# Patient Record
Sex: FEMALE | Race: WHITE | Employment: OTHER | URBAN - NONMETROPOLITAN AREA
[De-identification: names, ages, dates, MRNs, and addresses within clinical notes are randomized per-mention and may not be internally consistent; named-entity substitution may affect disease eponyms.]

---

## 2017-04-25 ENCOUNTER — HOSPITAL ENCOUNTER (OUTPATIENT)
Dept: NEUROLOGY | Age: 47
Discharge: HOME | End: 2017-04-25
Payer: COMMERCIAL

## 2017-04-25 PROCEDURE — 95886 MUSC TEST DONE W/N TEST COMP: CPT

## 2017-04-25 PROCEDURE — 95909 NRV CNDJ TST 5-6 STUDIES: CPT

## 2017-04-25 PROCEDURE — 95909 NRV CNDJ TST 5-6 STUDIES: CPT | Performed by: PSYCHIATRY & NEUROLOGY

## 2017-04-25 PROCEDURE — 95886 MUSC TEST DONE W/N TEST COMP: CPT | Performed by: PSYCHIATRY & NEUROLOGY

## 2017-12-07 ENCOUNTER — APPOINTMENT (OUTPATIENT)
Dept: GENERAL RADIOLOGY | Facility: HOSPITAL | Age: 47
End: 2017-12-07

## 2017-12-07 ENCOUNTER — HOSPITAL ENCOUNTER (EMERGENCY)
Facility: HOSPITAL | Age: 47
Discharge: HOME | End: 2017-12-07
Admitting: FAMILY MEDICINE

## 2017-12-07 ENCOUNTER — HOSPITAL ENCOUNTER (EMERGENCY)
Facility: HOSPITAL | Age: 47
Discharge: HOME | End: 2017-12-07
Attending: EMERGENCY MEDICINE | Admitting: EMERGENCY MEDICINE

## 2017-12-07 VITALS
HEART RATE: 95 BPM | WEIGHT: 257 LBS | OXYGEN SATURATION: 95 % | TEMPERATURE: 97.8 F | SYSTOLIC BLOOD PRESSURE: 124 MMHG | RESPIRATION RATE: 18 BRPM | HEIGHT: 65 IN | DIASTOLIC BLOOD PRESSURE: 84 MMHG | BODY MASS INDEX: 42.82 KG/M2

## 2017-12-07 VITALS
OXYGEN SATURATION: 95 % | WEIGHT: 256.84 LBS | RESPIRATION RATE: 20 BRPM | DIASTOLIC BLOOD PRESSURE: 75 MMHG | HEIGHT: 65 IN | SYSTOLIC BLOOD PRESSURE: 123 MMHG | BODY MASS INDEX: 42.79 KG/M2 | TEMPERATURE: 97.7 F | HEART RATE: 91 BPM

## 2017-12-07 DIAGNOSIS — S53.105A ELBOW DISLOCATION, LEFT, INITIAL ENCOUNTER: Primary | ICD-10-CM

## 2017-12-07 DIAGNOSIS — M79.602 LEFT ARM PAIN: Primary | ICD-10-CM

## 2017-12-07 DIAGNOSIS — S53.105S ELBOW DISLOCATION, LEFT, SEQUELA: ICD-10-CM

## 2017-12-07 PROCEDURE — 73030 X-RAY EXAM OF SHOULDER: CPT

## 2017-12-07 PROCEDURE — 25010000002 ONDANSETRON PER 1 MG: Performed by: NURSE PRACTITIONER

## 2017-12-07 PROCEDURE — 96372 THER/PROPH/DIAG INJ SC/IM: CPT

## 2017-12-07 PROCEDURE — 73070 X-RAY EXAM OF ELBOW: CPT

## 2017-12-07 PROCEDURE — 73080 X-RAY EXAM OF ELBOW: CPT

## 2017-12-07 PROCEDURE — 99284 EMERGENCY DEPT VISIT MOD MDM: CPT

## 2017-12-07 PROCEDURE — 73110 X-RAY EXAM OF WRIST: CPT

## 2017-12-07 PROCEDURE — 99285 EMERGENCY DEPT VISIT HI MDM: CPT

## 2017-12-07 PROCEDURE — 25010000002 HYDROMORPHONE PER 4 MG: Performed by: NURSE PRACTITIONER

## 2017-12-07 RX ORDER — PILOCARPINE HYDROCHLORIDE 5 MG/1
5 TABLET, FILM COATED ORAL 3 TIMES DAILY
COMMUNITY

## 2017-12-07 RX ORDER — OXYCODONE AND ACETAMINOPHEN 7.5; 325 MG/1; MG/1
1 TABLET ORAL EVERY 4 HOURS PRN
Qty: 15 TABLET | Refills: 0 | Status: SHIPPED | OUTPATIENT
Start: 2017-12-07

## 2017-12-07 RX ORDER — HYDROXYCHLOROQUINE SULFATE 200 MG/1
200 TABLET, FILM COATED ORAL 2 TIMES DAILY
COMMUNITY

## 2017-12-07 RX ORDER — GABAPENTIN 300 MG/1
300 CAPSULE ORAL 2 TIMES DAILY
COMMUNITY

## 2017-12-07 RX ORDER — PREDNISONE 1 MG/1
1 TABLET ORAL DAILY
COMMUNITY

## 2017-12-07 RX ORDER — HYDROCODONE BITARTRATE AND ACETAMINOPHEN 7.5; 325 MG/1; MG/1
1 TABLET ORAL 2 TIMES DAILY PRN
COMMUNITY

## 2017-12-07 RX ORDER — OMEPRAZOLE 20 MG/1
20 CAPSULE, DELAYED RELEASE ORAL DAILY
COMMUNITY

## 2017-12-07 RX ORDER — LEVOTHYROXINE SODIUM 0.12 MG/1
125 TABLET ORAL DAILY
COMMUNITY

## 2017-12-07 RX ORDER — FOLIC ACID 1 MG/1
1 TABLET ORAL DAILY
COMMUNITY

## 2017-12-07 RX ORDER — ETOMIDATE 2 MG/ML
0.3 INJECTION INTRAVENOUS ONCE
Status: COMPLETED | OUTPATIENT
Start: 2017-12-07 | End: 2017-12-07

## 2017-12-07 RX ORDER — ONDANSETRON 2 MG/ML
4 INJECTION INTRAMUSCULAR; INTRAVENOUS ONCE
Status: COMPLETED | OUTPATIENT
Start: 2017-12-07 | End: 2017-12-07

## 2017-12-07 RX ORDER — DULOXETIN HYDROCHLORIDE 30 MG/1
30 CAPSULE, DELAYED RELEASE ORAL DAILY
COMMUNITY

## 2017-12-07 RX ADMIN — HYDROMORPHONE HYDROCHLORIDE 1 MG: 1 INJECTION, SOLUTION INTRAMUSCULAR; INTRAVENOUS; SUBCUTANEOUS at 20:12

## 2017-12-07 RX ADMIN — ONDANSETRON 4 MG: 2 INJECTION, SOLUTION INTRAMUSCULAR; INTRAVENOUS at 20:12

## 2017-12-07 RX ADMIN — ETOMIDATE 5 MG: 2 INJECTION, SOLUTION INTRAVENOUS at 02:03

## 2017-12-07 NOTE — ED NOTES
PT TOLERATING ORAL FLUIDS AND O2 SAT WITHOUT OXYGEN. PT CAOX4 WITH  AT BEDSIDE.      Fausto Delgado RN  12/07/17 4842

## 2017-12-07 NOTE — ED PROVIDER NOTES
Subjective   HPI Comments: Patient comes in here after falling at home and striking her elbow on the hard floor.  Patient always been medicated by EMS and is Quite drowsy patient denies any head or neck pain and no other injury    Patient is a 47 y.o. female presenting with fall.   History provided by:  Patient and EMS personnel  Fall   Mechanism of injury: fall    Injury location:  Shoulder/arm  Shoulder/arm injury location:  L elbow  Incident location:  Home  Arrived directly from scene: yes    Fall:     Fall occurred:  In the bathroom    Height of fall:  0    Impact surface:  Hard floor    Point of impact: ELBOW.  Suspicion of alcohol use: no    Suspicion of drug use: no    Prior to arrival data:     Immobilization:  None  Associated symptoms: no abdominal pain, no back pain, no chest pain, no difficulty breathing, no headaches, no loss of consciousness, no nausea, no neck pain, no seizures and no vomiting    Risk factors: steroid use    Risk factors: no anticoagulation therapy, no asthma, no CHF, no COPD, no diabetes, no dialysis, no hemophilia, no kidney disease and no past MI        Review of Systems   Respiratory: Negative.    Cardiovascular: Negative for chest pain.   Gastrointestinal: Negative for abdominal pain, nausea and vomiting.   Genitourinary: Negative.    Musculoskeletal: Negative for back pain and neck pain.   Neurological: Negative for seizures, loss of consciousness and headaches.   All other systems reviewed and are negative.      Past Medical History:   Diagnosis Date   • Disease of thyroid gland    • Lupus        No Known Allergies    Past Surgical History:   Procedure Laterality Date   • CHOLECYSTECTOMY     • PARTIAL HYSTERECTOMY         History reviewed. No pertinent family history.    Social History     Social History   • Marital status:      Spouse name: N/A   • Number of children: N/A   • Years of education: N/A     Social History Main Topics   • Smoking status: Never Smoker   •  Smokeless tobacco: None   • Alcohol use No   • Drug use: No   • Sexual activity: Not Asked     Other Topics Concern   • None     Social History Narrative   • None           Objective   Physical Exam   Constitutional: She is oriented to person, place, and time. She appears well-developed and well-nourished. She appears distressed.   HENT:   Head: Normocephalic and atraumatic.   Eyes: Conjunctivae are normal. Pupils are equal, round, and reactive to light.   Neck: Normal range of motion.   Cardiovascular: Normal rate and regular rhythm.    Pulmonary/Chest: Effort normal and breath sounds normal.   Abdominal: Soft. There is no tenderness.   Musculoskeletal:   Patient appears to have deformity to the left elbow.  Patient is now any open areas and distal neurovascular examination is intact.  Patient rest examination is unremarkable.  Patient does of some shoulder pain although shoulder appears to be in place   Neurological: She is alert and oriented to person, place, and time. She exhibits normal muscle tone.   Skin: Skin is warm and dry.   Nursing note and vitals reviewed.      FX Dislocation  Date/Time: 12/7/2017 2:00 AM  Performed by: PEPPER FOFANA  Authorized by: PEPPER FOFANA     Consent:     Consent obtained:  Written    Consent given by:  Spouse    Risks discussed:  Nerve damage, pain and vascular damage    Alternatives discussed:  No treatment, delayed treatment and referral  Injury:     Injury location:  Elbow    Elbow injury location:  L elbow    Elbow fracture type comment:  Posterior elbow dislocation  Pre-procedure assessment:     Neurological function: normal      Distal perfusion: normal      Range of motion: normal    Sedation:     Sedation type:  Moderate (conscious) sedation  Procedure details:     Manipulation performed: yes      Skin traction used: no      Skeletal traction used: no      Pin inserted: no      Reduction successful: yes      X-ray confirmed reduction: yes      Immobilization:   Sling and splint    Splint type:  Long arm    Supplies used:  Ortho-Glass  Post-procedure assessment:     Neurological function: normal      Distal perfusion: normal      Range of motion: normal      Patient tolerance of procedure:  Tolerated well, no immediate complications  Comments:      Postprocedure x-ray shows patient to good reduction without any fracture fragments             ED Course  ED Course        Labs Reviewed - No data to display    XR Shoulder 2+ View Left    (Results Pending)   XR Elbow 3+ View Left    (Results Pending)   XR Elbow 2 View Left    (Results Pending)           x-ray of shoulder was unremarkable and post reduction film showed no fracture and good reduction.  Patient was given a long-arm splint in position of function by me and patient neurovascular examination was unremarkable.  Patient also was given sling to the left arm.  Patient was given orthopedic referral and called office and morning    [facility]    Final diagnoses:   Elbow dislocation, left, initial encounter            Luiz Pearl MD  12/07/17 0256

## 2017-12-08 NOTE — ED PROVIDER NOTES
Subjective   HPI Comments: Patient is a 39yo white female presents with left upper extremity pain.  She was seen in the emergency department last not after falling and sustained a left elbow dislocation.  She states that she was sedated and the elbow was reduced and she was placed in a splint.  She left the emergency department around 3 or 4 o'clock this morning.  She states throughout the day she has had increased swelling and pain and states that she had she noticed some blister formation from underneath her splint that she had placed last night.  She states she is also having left wrist pain as well and she does not think it was x-rayed last night.  She states her fingers are very swollen tonight and she is beginning not to be able to fill her fingers.    Patient is a 47 y.o. female presenting with upper extremity pain.   History provided by:  Patient   used: No    Upper Extremity Issue       Review of Systems   Constitutional: Negative.    HENT: Negative.    Eyes: Negative.    Respiratory: Negative.    Cardiovascular: Negative.    Gastrointestinal: Negative.    Endocrine: Negative.    Genitourinary: Negative.    Musculoskeletal:        Pt presents with lue pain and swelling. Pt was seen in the er last night after falling and sustained an elbow dislocation. She states that she left here around 0300 this am. She states that she has kept the extremity elevated throughout the day but has continued have increased pain and swelling to the extremity.  She states that she noticed some blister formation noted to the medial aspect of her left elbow earlier today and she is having swelling to her fingers and states she is having some numbness and tingling to her fingers as well.  She states she is also having some left wrist pain as well and does not think that she had her wrist x-rayed last night.   Skin: Negative.    Allergic/Immunologic: Negative.    Neurological: Negative.    Hematological:  Negative.    Psychiatric/Behavioral: Negative.    All other systems reviewed and are negative.      Past Medical History:   Diagnosis Date   • Disease of thyroid gland    • Fibromyalgia    • Lupus        No Known Allergies    Past Surgical History:   Procedure Laterality Date   • CHOLECYSTECTOMY     • PARTIAL HYSTERECTOMY         History reviewed. No pertinent family history.    Social History     Social History   • Marital status:      Spouse name: N/A   • Number of children: N/A   • Years of education: N/A     Social History Main Topics   • Smoking status: Never Smoker   • Smokeless tobacco: Never Used   • Alcohol use No   • Drug use: No   • Sexual activity: Not Asked     Other Topics Concern   • None     Social History Narrative   • None       Prior to Admission medications    Medication Sig Start Date End Date Taking? Authorizing Provider   DULoxetine (CYMBALTA) 30 MG capsule Take 30 mg by mouth Daily.   Yes Historical Provider, MD   folic acid (FOLVITE) 1 MG tablet Take 1 mg by mouth Daily.   Yes Historical Provider, MD   gabapentin (NEURONTIN) 300 MG capsule Take 300 mg by mouth 2 (Two) Times a Day.   Yes Historical Provider, MD   HYDROcodone-acetaminophen (NORCO) 7.5-325 MG per tablet Take 1 tablet by mouth 2 (Two) Times a Day As Needed for Moderate Pain .   Yes Historical Provider, MD   hydroxychloroquine (PLAQUENIL) 200 MG tablet Take 200 mg by mouth 2 (Two) Times a Day.   Yes Historical Provider, MD   levothyroxine (SYNTHROID, LEVOTHROID) 125 MCG tablet Take 125 mcg by mouth Daily.   Yes Historical Provider, MD   omeprazole (priLOSEC) 20 MG capsule Take 20 mg by mouth Daily.   Yes Historical Provider, MD   PrednisoLONE 5 MG tablet Take 4 mg by mouth Daily.   Yes Historical Provider, MD   methotrexate 2.5 MG tablet Take 2.5 mg by mouth 1 (One) Time Per Week. 6 tablets once a week    Historical Provider, MD   pilocarpine (SALAGEN) 5 MG tablet Take 5 mg by mouth 3 (Three) Times a Day.    Historical  "Provider, MD   predniSONE (DELTASONE) 1 MG tablet Take 1 mg by mouth Daily. Take 4 tablets daily or as instructed.    Historical Provider, MD       /75 (BP Location: Right arm, Patient Position: Lying)  Pulse 91  Temp 97.7 °F (36.5 °C) (Temporal Artery )   Resp 20  Ht 165.1 cm (65\")  Wt 116 kg (256 lb 13.4 oz)  SpO2 95%  BMI 42.74 kg/m2    Objective   Physical Exam   Constitutional: She is oriented to person, place, and time. She appears well-developed and well-nourished.   Appears to be in pain    HENT:   Head: Normocephalic and atraumatic.   Eyes: Conjunctivae and EOM are normal. Pupils are equal, round, and reactive to light.   Neck: Normal range of motion. Neck supple. No tracheal deviation present. No thyromegaly present.   Cardiovascular: Normal rate, regular rhythm, normal heart sounds and intact distal pulses.    Pulmonary/Chest: Effort normal and breath sounds normal. No respiratory distress. She has no wheezes. She has no rales. She exhibits no tenderness.   Abdominal: Soft. Bowel sounds are normal.   Musculoskeletal:   LUE: moderate amt of soft tissue swelling noted. Splint with opening noted to medial aspect of left elbow with moderate blister formation note. Fingers very edematous and cool to the touch. Splint very tight. Splint is removed. Pt has tenderness on palpation of distal radial aspect of left wrist as well. Pedal pulses palp.    Neurological: She is alert and oriented to person, place, and time. She has normal reflexes. No cranial nerve deficit.   Skin: Skin is warm and dry.   Psychiatric: She has a normal mood and affect. Her behavior is normal. Judgment and thought content normal.   Nursing note and vitals reviewed.      Procedures         Lab Results (last 24 hours)     ** No results found for the last 24 hours. **          XR Wrist 3+ View Left   Final Result      XR Shoulder 2+ View Left   Final Result          ED Course  ED Course   Comment By Time   Reviewed results with pt " and pt spouse. Have ordered another splint on pt. Gopi report completed #[id number]. No signs of suspicious activity noted. Will write for small amt of pain medication for acute pain. Reviewed side effects and potential for abuse. Will be discharged soon in stable condition. Pt has appointment with dr andrade next week. Advised to keep elevated and ice. Advised to return before if symptoms worsen Drea Sidhu, JERE 12/07 2046          MDM  Number of Diagnoses or Management Options  Elbow dislocation, left, sequela: new and requires workup  Left arm pain: new and requires workup     Amount and/or Complexity of Data Reviewed  Tests in the radiology section of CPT®: ordered and reviewed    Patient Progress  Patient progress: stable      Final diagnoses:   Left arm pain   Elbow dislocation, left, sequela          Drea Sidhu, JERE  12/13/17 8868

## 2017-12-08 NOTE — ED NOTES
C/o's 'blister to my left arm, I slipped & fell last night, came here, they said my elbow was dislocated, they sedated me & set me with this cast, left ED approx 4am this morning.' To see  December 12.'     Eelni Singer RN  12/07/17 1941

## 2017-12-13 NOTE — ED NOTES
"ED Call Back Questions    1. How are you doing since leaving the Emergency Department?    Doing better than I was, went to ortho and they fixed my splint again  2. Do you have any questions about your discharge instructions? No     3. Have you filled your new prescriptions yet? Yes   a. Do you have any questions about those medications? No     4. Were you able to make a follow-up appointment with the physician? Yes     5. Do you have a primary care physician? Yes   a. If No, would you like for me to set you up with one? N/A  i. If Yes, “I will have our ED  give you a call right back at this number to work with you on the best time for an appointment.”    6. We are always looking to get better at what we do. Do you have any suggestions for what we can do to be even better? N/A  a. If Yes, \"Thank you for sharing your concerns. I apologize. I will follow up with our manager and patient . Would you like someone to call you back?\" No     7. Is there anything else I can do for you? No   Visit was ok     Dale Wright  12/13/17 1500    "

## 2019-03-25 RX ORDER — GABAPENTIN 300 MG/1
300 CAPSULE ORAL 3 TIMES DAILY
COMMUNITY

## 2019-03-25 RX ORDER — DULOXETIN HYDROCHLORIDE 30 MG/1
30 CAPSULE, DELAYED RELEASE ORAL NIGHTLY
COMMUNITY

## 2019-03-25 RX ORDER — HYDROXYCHLOROQUINE SULFATE 200 MG/1
TABLET, FILM COATED ORAL 2 TIMES DAILY
COMMUNITY

## 2019-03-25 RX ORDER — LEVOTHYROXINE SODIUM 0.15 MG/1
150 TABLET ORAL DAILY
COMMUNITY

## 2019-03-25 RX ORDER — PREDNISONE 10 MG/1
10 TABLET ORAL DAILY
COMMUNITY

## 2019-03-25 RX ORDER — FOLIC ACID 1 MG/1
1 TABLET ORAL DAILY
COMMUNITY

## 2019-04-04 ENCOUNTER — TELEPHONE (OUTPATIENT)
Dept: GASTROENTEROLOGY | Age: 49
End: 2019-04-04

## 2019-04-04 NOTE — TELEPHONE ENCOUNTER
I tried calling this NP to discuss open access colon screen, she did not answer her phone, I did leave her a VM @ 1:18 PM asking for a return call.  Clemente poe

## 2019-08-13 NOTE — PROGRESS NOTES
Subjective:      Patient ID: Noam Moscoso is a 44 y.o. female  Joice Kocher, APRN - CNP  Helen Gillespies, APRN - C*    HPI  Chief Complaint   Patient presents with    Anemia       New patient referred for c/o anemia. She has never had colonoscopy. Anemia  Patient presents for evaluation of anemia. It has been present for several months. Associated signs & symptoms: dyspnea and fatigue. Patient denies abdominal pain, hematochezia, melena, menorrhagia and hematemesis. Current labs: hgb 9.7 on 7/18/2019  Patient now taking iron supplement. Patient is postmenopausal.   Pt denies any heartburn, reflux, nausea, dysphagia, melena. Assessment:      1. Anemia, unspecified type    2. Fatigue, unspecified type            Plan:      Schedule colonoscopy and endoscopy       Instruct on bowel prep. Nothing to eat or drink after midnight the day of the exam.  Unable to drive for 24 hours after the procedure. No aspirin or nonsteroidal anti-inflammatories for 5 days before procedure. I have discussed the benefits, alternatives, and risks (including bleeding, perforation and death)  for pursuing Endoscopy (EGD/Colonscopy/EUS/ERCP) with the patient and they are willing to continue. We also discussed the need for anesthesia, IV access, proper dietary changes, medication changes if necessary, and need for bowel prep (if ordered) prior to their Endoscopic procedure. They are aware they must have someone accompany them to their scheduled procedure to drive them home - they agree to the above and are willing to continue.       Plan for anesthesia: MAC  no reported complications                  Family History   Problem Relation Age of Onset    Colon Cancer Neg Hx     Colon Polyps Neg Hx     Esophageal Cancer Neg Hx     Liver Cancer Neg Hx     Liver Disease Neg Hx     Rectal Cancer Neg Hx     Stomach Cancer Neg Hx        Past Medical History:   Diagnosis Date    Anemia     Fibromyalgia     Hypothyroidism History reviewed. No pertinent surgical history. Current Outpatient Medications   Medication Sig Dispense Refill    Ascorbic Acid (VITAMIN C) 1000 MG tablet Take 1,000 mg by mouth daily      calcium acetate-magnesium carb 450-200 MG TABS Take by mouth 2 times daily      Fe Bisgly-Succ-C-Thre-B12-FA (IRON-150 PO) Take by mouth daily      predniSONE (DELTASONE) 10 MG tablet Take 10 mg by mouth daily      hydroxychloroquine (PLAQUENIL) 200 MG tablet Take by mouth 2 times daily       gabapentin (NEURONTIN) 300 MG capsule Take 300 mg by mouth 3 times daily.  Levothyroxine Sodium 125 MCG CAPS Take by mouth Daily      folic acid (FOLVITE) 1 MG tablet Take 1 mg by mouth daily      DULoxetine (CYMBALTA) 30 MG extended release capsule Take 30 mg by mouth daily      omeprazole (PRILOSEC) 20 MG delayed release capsule Take 20 mg by mouth daily       No current facility-administered medications for this visit. No Known Allergies     reports that she has never smoked. She has never used smokeless tobacco. She reports that she does not drink alcohol or use drugs. Review of Systems   Constitutional: Positive for fatigue. Negative for appetite change, fever and unexpected weight change. HENT: Negative for sore throat and voice change. Respiratory: Positive for shortness of breath. Negative for cough and chest tightness. Cardiovascular: Negative for chest pain, palpitations and leg swelling. Gastrointestinal: Negative for abdominal distention, abdominal pain, blood in stool, constipation, diarrhea, nausea, rectal pain and vomiting. Musculoskeletal: Negative for arthralgias, back pain and gait problem. Skin: Negative for pallor, rash and wound. Neurological: Negative for dizziness, weakness and light-headedness. Hematological: Negative for adenopathy. Does not bruise/bleed easily. All other systems reviewed and are negative.       Objective:   Physical Exam   Constitutional: She is

## 2019-08-14 ENCOUNTER — OFFICE VISIT (OUTPATIENT)
Dept: GASTROENTEROLOGY | Age: 49
End: 2019-08-14
Payer: COMMERCIAL

## 2019-08-14 VITALS
HEART RATE: 98 BPM | SYSTOLIC BLOOD PRESSURE: 130 MMHG | WEIGHT: 240.6 LBS | HEIGHT: 65 IN | OXYGEN SATURATION: 96 % | DIASTOLIC BLOOD PRESSURE: 80 MMHG | BODY MASS INDEX: 40.08 KG/M2

## 2019-08-14 DIAGNOSIS — R53.83 FATIGUE, UNSPECIFIED TYPE: ICD-10-CM

## 2019-08-14 DIAGNOSIS — D64.9 ANEMIA, UNSPECIFIED TYPE: Primary | ICD-10-CM

## 2019-08-14 PROCEDURE — 99204 OFFICE O/P NEW MOD 45 MIN: CPT | Performed by: NURSE PRACTITIONER

## 2019-08-14 RX ORDER — MULTIVIT WITH MINERALS/LUTEIN
1000 TABLET ORAL DAILY
COMMUNITY

## 2019-08-14 RX ORDER — OMEPRAZOLE 20 MG/1
20 CAPSULE, DELAYED RELEASE ORAL DAILY
COMMUNITY
Start: 2019-07-15

## 2019-08-14 SDOH — HEALTH STABILITY: MENTAL HEALTH: HOW OFTEN DO YOU HAVE A DRINK CONTAINING ALCOHOL?: NEVER

## 2019-08-14 ASSESSMENT — ENCOUNTER SYMPTOMS
ABDOMINAL DISTENTION: 0
BLOOD IN STOOL: 0
VOMITING: 0
BACK PAIN: 0
DIARRHEA: 0
RECTAL PAIN: 0
CONSTIPATION: 0
SORE THROAT: 0
COUGH: 0
VOICE CHANGE: 0
SHORTNESS OF BREATH: 1
CHEST TIGHTNESS: 0
NAUSEA: 0
ABDOMINAL PAIN: 0

## 2019-08-27 ENCOUNTER — HOSPITAL ENCOUNTER (OUTPATIENT)
Age: 49
Setting detail: OUTPATIENT SURGERY
Discharge: HOME | End: 2019-08-27
Attending: INTERNAL MEDICINE | Admitting: INTERNAL MEDICINE
Payer: COMMERCIAL

## 2019-08-27 ENCOUNTER — ANESTHESIA (OUTPATIENT)
Dept: ENDOSCOPY | Age: 49
End: 2019-08-27
Payer: COMMERCIAL

## 2019-08-27 ENCOUNTER — ANESTHESIA EVENT (OUTPATIENT)
Dept: ENDOSCOPY | Age: 49
End: 2019-08-27
Payer: COMMERCIAL

## 2019-08-27 VITALS
DIASTOLIC BLOOD PRESSURE: 58 MMHG | SYSTOLIC BLOOD PRESSURE: 116 MMHG | TEMPERATURE: 98.1 F | HEIGHT: 65 IN | OXYGEN SATURATION: 100 % | WEIGHT: 240 LBS | HEART RATE: 99 BPM | BODY MASS INDEX: 39.99 KG/M2 | RESPIRATION RATE: 18 BRPM

## 2019-08-27 PROCEDURE — 2709999900 HC NON-CHARGEABLE SUPPLY: Performed by: INTERNAL MEDICINE

## 2019-08-27 PROCEDURE — 2580000003 HC RX 258: Performed by: INTERNAL MEDICINE

## 2019-08-27 PROCEDURE — 7100000011 HC PHASE II RECOVERY - ADDTL 15 MIN: Performed by: INTERNAL MEDICINE

## 2019-08-27 PROCEDURE — 7100000010 HC PHASE II RECOVERY - FIRST 15 MIN: Performed by: INTERNAL MEDICINE

## 2019-08-27 PROCEDURE — 88305 TISSUE EXAM BY PATHOLOGIST: CPT

## 2019-08-27 PROCEDURE — 2500000003 HC RX 250 WO HCPCS: Performed by: NURSE ANESTHETIST, CERTIFIED REGISTERED

## 2019-08-27 PROCEDURE — 3700000000 HC ANESTHESIA ATTENDED CARE: Performed by: INTERNAL MEDICINE

## 2019-08-27 PROCEDURE — 6360000002 HC RX W HCPCS: Performed by: NURSE ANESTHETIST, CERTIFIED REGISTERED

## 2019-08-27 PROCEDURE — 43239 EGD BIOPSY SINGLE/MULTIPLE: CPT | Performed by: INTERNAL MEDICINE

## 2019-08-27 PROCEDURE — 3609012400 HC EGD TRANSORAL BIOPSY SINGLE/MULTIPLE: Performed by: INTERNAL MEDICINE

## 2019-08-27 PROCEDURE — 2500000003 HC RX 250 WO HCPCS: Performed by: INTERNAL MEDICINE

## 2019-08-27 PROCEDURE — 3609009500 HC COLONOSCOPY DIAGNOSTIC OR SCREENING: Performed by: INTERNAL MEDICINE

## 2019-08-27 RX ORDER — LIDOCAINE HYDROCHLORIDE 10 MG/ML
INJECTION, SOLUTION EPIDURAL; INFILTRATION; INTRACAUDAL; PERINEURAL PRN
Status: DISCONTINUED | OUTPATIENT
Start: 2019-08-27 | End: 2019-08-27 | Stop reason: SDUPTHER

## 2019-08-27 RX ORDER — ONDANSETRON 4 MG/1
4 TABLET, FILM COATED ORAL EVERY 8 HOURS PRN
Qty: 3 TABLET | Refills: 0 | Status: SHIPPED | OUTPATIENT
Start: 2019-08-27

## 2019-08-27 RX ORDER — DULOXETIN HYDROCHLORIDE 60 MG/1
60 CAPSULE, DELAYED RELEASE ORAL EVERY MORNING
COMMUNITY

## 2019-08-27 RX ORDER — GLUCOSA SU 2KCL/CHONDROITIN SU 500-400 MG
CAPSULE ORAL DAILY
COMMUNITY

## 2019-08-27 RX ORDER — PROPOFOL 10 MG/ML
INJECTION, EMULSION INTRAVENOUS PRN
Status: DISCONTINUED | OUTPATIENT
Start: 2019-08-27 | End: 2019-08-27 | Stop reason: SDUPTHER

## 2019-08-27 RX ORDER — FENTANYL CITRATE 50 UG/ML
INJECTION, SOLUTION INTRAMUSCULAR; INTRAVENOUS PRN
Status: DISCONTINUED | OUTPATIENT
Start: 2019-08-27 | End: 2019-08-27 | Stop reason: SDUPTHER

## 2019-08-27 RX ORDER — SODIUM CHLORIDE, SODIUM LACTATE, POTASSIUM CHLORIDE, CALCIUM CHLORIDE 600; 310; 30; 20 MG/100ML; MG/100ML; MG/100ML; MG/100ML
INJECTION, SOLUTION INTRAVENOUS CONTINUOUS
Status: DISCONTINUED | OUTPATIENT
Start: 2019-08-27 | End: 2019-08-27 | Stop reason: HOSPADM

## 2019-08-27 RX ORDER — LIDOCAINE HYDROCHLORIDE 10 MG/ML
1 INJECTION, SOLUTION EPIDURAL; INFILTRATION; INTRACAUDAL; PERINEURAL ONCE
Status: COMPLETED | OUTPATIENT
Start: 2019-08-27 | End: 2019-08-27

## 2019-08-27 RX ADMIN — FENTANYL CITRATE 100 MCG: 50 INJECTION INTRAMUSCULAR; INTRAVENOUS at 11:32

## 2019-08-27 RX ADMIN — PROPOFOL 150 MG: 10 INJECTION, EMULSION INTRAVENOUS at 11:34

## 2019-08-27 RX ADMIN — SODIUM CHLORIDE, POTASSIUM CHLORIDE, SODIUM LACTATE AND CALCIUM CHLORIDE: 600; 310; 30; 20 INJECTION, SOLUTION INTRAVENOUS at 10:18

## 2019-08-27 RX ADMIN — LIDOCAINE HYDROCHLORIDE 1 ML: 10 INJECTION, SOLUTION EPIDURAL; INFILTRATION; INTRACAUDAL; PERINEURAL at 10:18

## 2019-08-27 RX ADMIN — LIDOCAINE HYDROCHLORIDE 50 MG: 10 INJECTION, SOLUTION EPIDURAL; INFILTRATION; INTRACAUDAL; PERINEURAL at 11:32

## 2019-08-27 ASSESSMENT — PAIN SCALES - GENERAL: PAINLEVEL_OUTOF10: 0

## 2019-08-27 ASSESSMENT — PAIN - FUNCTIONAL ASSESSMENT: PAIN_FUNCTIONAL_ASSESSMENT: 0-10

## 2019-08-27 NOTE — ANESTHESIA POSTPROCEDURE EVALUATION
Department of Anesthesiology  Postprocedure Note    Patient: Brittney Javier  MRN: [id number]  YOB: 1970  Date of evaluation: 8/27/2019  Time:  11:39 AM     Procedure Summary     Date:  08/27/19 Room / Location:  [facility] ENDO 10 / [facility]    Anesthesia Start:  5120 Anesthesia Stop:  1138    Procedures:       EGD BIOPSY (N/A Abdomen)      COLONOSCOPY DIAGNOSTIC (N/A ) Diagnosis:  (HEME+STOOL)    Surgeon:  Elaine Arreola MD Responsible Provider:  LYLE Marroquin CRNA    Anesthesia Type:  general, TIVA ASA Status:  2          Anesthesia Type: general, TIVA    James Phase I: James Score: 10    James Phase II:      Last vitals: Reviewed and per EMR flowsheets. Anesthesia Post Evaluation    Patient location during evaluation: PACU  Patient participation: complete - patient participated  Level of consciousness: awake and alert  Pain score: 0  Airway patency: patent  Nausea & Vomiting: no nausea and no vomiting  Complications: no  Cardiovascular status: hemodynamically stable and blood pressure returned to baseline  Respiratory status: acceptable and nasal cannula  Hydration status: stable  Comments: Vital Signs Stable. Exchanging well. PACU RN received care.

## 2019-08-27 NOTE — H&P
Patient Name: Bere Corona  : 1970  MRN: [id number]  DATE: 19    Allergies: Allergies   Allergen Reactions    Adhesive Tape         ENDOSCOPY  History and Physical    Procedure:    [] Diagnostic Colonoscopy       [] Screening Colonoscopy  [x] EGD      [] ERCP      [] EUS       [] Other    [x] Previous office notes/History and Physical reviewed from the patients chart. Please see EMR for further details of HPI. I have examined the patient's status immediately prior to the procedure and:      Indications/HPI:   1. Anemia, unspecified type    2. Fatigue, unspecified type         []Abdominal Pain   []Cancer- GI/Lung     []Fhx of colon CA/polyps  []History of Polyps  []Barretts            []Melena  []Abnormal Imaging              []Dysphagia              []Persistent Pneumonia   []Anemia                            []Food Impaction        []History of Polyps  [] GI Bleed             []Pulmonary nodule/Mass   []Change in bowel habits []Heartburn/Reflux  []Rectal Bleed (BRBPR)  []Chest Pain - Non Cardiac []Heme (+) Stool []Ulcers  []Constipation  []Hemoptysis  []Varices  []Diarrhea  []Hypoxemia    []Nausea/Vomiting   []Screening   []Crohns/Colitis  []Other:     Anesthesia:   [x] MAC [] Moderate Sedation   [] General   [] None     ROS: 12 pt Review of Symptoms was negative unless mentioned above    Medications:   Prior to Admission medications    Medication Sig Start Date End Date Taking?  Authorizing Provider   DULoxetine (CYMBALTA) 60 MG extended release capsule Take 60 mg by mouth every morning   Yes Historical Provider, MD   Coenzyme Q10 (CO Q10) 100 MG CAPS Take by mouth daily   Yes Historical Provider, MD   Ascorbic Acid (VITAMIN C) 1000 MG tablet Take 1,000 mg by mouth daily    Historical Provider, MD   calcium acetate-magnesium carb 450-200 MG TABS Take by mouth 2 times daily    Historical Provider, MD   omeprazole (PRILOSEC) 20 MG delayed release capsule Take 20 mg by mouth daily 7/15/19

## 2019-08-28 ENCOUNTER — ANESTHESIA (OUTPATIENT)
Dept: OPERATING ROOM | Age: 49
End: 2019-08-28

## 2019-08-28 ENCOUNTER — ANESTHESIA EVENT (OUTPATIENT)
Dept: OPERATING ROOM | Age: 49
End: 2019-08-28

## 2019-08-28 ENCOUNTER — HOSPITAL ENCOUNTER (OUTPATIENT)
Age: 49
Setting detail: OUTPATIENT SURGERY
Discharge: HOME | End: 2019-08-28
Attending: INTERNAL MEDICINE | Admitting: INTERNAL MEDICINE
Payer: COMMERCIAL

## 2019-08-28 ENCOUNTER — APPOINTMENT (OUTPATIENT)
Dept: OPERATING ROOM | Age: 49
End: 2019-08-28

## 2019-08-28 VITALS — SYSTOLIC BLOOD PRESSURE: 142 MMHG | OXYGEN SATURATION: 99 % | DIASTOLIC BLOOD PRESSURE: 84 MMHG

## 2019-08-28 VITALS
HEIGHT: 65 IN | HEART RATE: 74 BPM | TEMPERATURE: 97.2 F | DIASTOLIC BLOOD PRESSURE: 73 MMHG | SYSTOLIC BLOOD PRESSURE: 120 MMHG | BODY MASS INDEX: 39.99 KG/M2 | OXYGEN SATURATION: 99 % | WEIGHT: 240 LBS | RESPIRATION RATE: 16 BRPM

## 2019-08-28 PROCEDURE — G8907 PT DOC NO EVENTS ON DISCHARG: HCPCS

## 2019-08-28 PROCEDURE — G8918 PT W/O PREOP ORDER IV AB PRO: HCPCS

## 2019-08-28 PROCEDURE — 45378 DIAGNOSTIC COLONOSCOPY: CPT

## 2019-08-28 PROCEDURE — 45378 DIAGNOSTIC COLONOSCOPY: CPT | Performed by: INTERNAL MEDICINE

## 2019-08-28 RX ORDER — LIDOCAINE HYDROCHLORIDE 10 MG/ML
INJECTION, SOLUTION INFILTRATION; PERINEURAL PRN
Status: DISCONTINUED | OUTPATIENT
Start: 2019-08-28 | End: 2019-08-28 | Stop reason: SDUPTHER

## 2019-08-28 RX ORDER — PROPOFOL 10 MG/ML
INJECTION, EMULSION INTRAVENOUS PRN
Status: DISCONTINUED | OUTPATIENT
Start: 2019-08-28 | End: 2019-08-28 | Stop reason: SDUPTHER

## 2019-08-28 RX ORDER — SODIUM CHLORIDE 9 MG/ML
INJECTION, SOLUTION INTRAVENOUS CONTINUOUS
Status: DISCONTINUED | OUTPATIENT
Start: 2019-08-28 | End: 2019-08-28 | Stop reason: HOSPADM

## 2019-08-28 RX ORDER — SODIUM CHLORIDE 9 MG/ML
INJECTION, SOLUTION INTRAVENOUS CONTINUOUS
Status: CANCELLED | OUTPATIENT
Start: 2019-08-28

## 2019-08-28 RX ADMIN — PROPOFOL 50 MG: 10 INJECTION, EMULSION INTRAVENOUS at 10:32

## 2019-08-28 RX ADMIN — SODIUM CHLORIDE: 9 INJECTION, SOLUTION INTRAVENOUS at 09:42

## 2019-08-28 RX ADMIN — LIDOCAINE HYDROCHLORIDE 5 ML: 10 INJECTION, SOLUTION INFILTRATION; PERINEURAL at 10:32

## 2019-08-28 RX ADMIN — PROPOFOL 50 MG: 10 INJECTION, EMULSION INTRAVENOUS at 10:40

## 2019-08-28 RX ADMIN — PROPOFOL 50 MG: 10 INJECTION, EMULSION INTRAVENOUS at 10:35

## 2019-08-28 RX ADMIN — PROPOFOL 50 MG: 10 INJECTION, EMULSION INTRAVENOUS at 10:44

## 2019-08-28 RX ADMIN — PROPOFOL 50 MG: 10 INJECTION, EMULSION INTRAVENOUS at 10:37

## 2019-08-28 ASSESSMENT — PAIN SCALES - GENERAL
PAINLEVEL_OUTOF10: 0
PAINLEVEL_OUTOF10: 0

## 2019-08-28 ASSESSMENT — PAIN - FUNCTIONAL ASSESSMENT: PAIN_FUNCTIONAL_ASSESSMENT: 0-10

## 2019-08-28 NOTE — ANESTHESIA PRE PROCEDURE
Department of Anesthesiology  Preprocedure Note       Name:  Terrance Robert   Age:  44 y.o.  :  1970                                          MRN:  [id number]         Date:  2019      Surgeon: Bere Jason):  Hanh Melvin MD    Procedure: COLORECTAL CANCER SCREENING, NOT HIGH RISK (N/A Abdomen)    Medications prior to admission:   Prior to Admission medications    Medication Sig Start Date End Date Taking? Authorizing Provider   DULoxetine (CYMBALTA) 60 MG extended release capsule Take 60 mg by mouth every morning   Yes Historical Provider, MD   ondansetron (ZOFRAN) 4 MG tablet Take 1 tablet by mouth every 8 hours as needed for Nausea or Vomiting 19  Yes Hanh Melvin MD   gabapentin (NEURONTIN) 300 MG capsule Take 300 mg by mouth 3 times daily. Yes Historical Provider, MD   DULoxetine (CYMBALTA) 30 MG extended release capsule Take 30 mg by mouth nightly    Yes Historical Provider, MD   Coenzyme Q10 (CO Q10) 100 MG CAPS Take by mouth daily    Historical Provider, MD   Ascorbic Acid (VITAMIN C) 1000 MG tablet Take 1,000 mg by mouth daily    Historical Provider, MD   calcium acetate-magnesium carb 450-200 MG TABS Take by mouth 2 times daily    Historical Provider, MD   omeprazole (PRILOSEC) 20 MG delayed release capsule Take 20 mg by mouth daily 7/15/19   Historical Provider, MD   Fe Bisgly-Succ-C-Thre-B12-FA (IRON-150 PO) Take by mouth daily    Historical Provider, MD   predniSONE (DELTASONE) 10 MG tablet Take 10 mg by mouth daily    Historical Provider, MD   hydroxychloroquine (PLAQUENIL) 200 MG tablet Take by mouth 2 times daily     Historical Provider, MD   Levothyroxine Sodium 125 MCG CAPS Take by mouth Daily    Historical Provider, MD   folic acid (FOLVITE) 1 MG tablet Take 1 mg by mouth daily    Historical Provider, MD       Current medications:    No current facility-administered medications for this encounter. Allergies:     Allergies   Allergen Reactions    Adhesive

## 2019-08-28 NOTE — H&P
Provider, MD   omeprazole (PRILOSEC) 20 MG delayed release capsule Take 20 mg by mouth daily 7/15/19  Yes Historical Provider, MD   Fe Bisgly-Succ-C-Thre-B12-FA (IRON-150 PO) Take by mouth daily   Yes Historical Provider, MD   predniSONE (DELTASONE) 10 MG tablet Take 10 mg by mouth daily   Yes Historical Provider, MD   hydroxychloroquine (PLAQUENIL) 200 MG tablet Take by mouth 2 times daily    Yes Historical Provider, MD   gabapentin (NEURONTIN) 300 MG capsule Take 300 mg by mouth 3 times daily.    Yes Historical Provider, MD   Levothyroxine Sodium 125 MCG CAPS Take by mouth Daily   Yes Historical Provider, MD   folic acid (FOLVITE) 1 MG tablet Take 1 mg by mouth daily   Yes Historical Provider, MD   DULoxetine (CYMBALTA) 30 MG extended release capsule Take 30 mg by mouth nightly    Yes Historical Provider, MD       Past Medical History:  Past Medical History:   Diagnosis Date    Anemia     Arthritis     RA    Fibromyalgia     GERD (gastroesophageal reflux disease)     Hypothyroidism     hypothyroidism    [facility]upus ([facility] [address]     Sjogren's disease ([facility] [address]        Past Surgical History:  Past Surgical History:   Procedure Laterality Date    CHOLECYSTECTOMY      COLONOSCOPY N/A 8/27/2019    Dr Kendall Duffy to 8/28/19 due to prep    OVARY REMOVAL Right     UPPER GASTROINTESTINAL ENDOSCOPY N/A 8/27/2019    Dr Guadalupe King appearing gastric polyps       Social History:  Social History     Tobacco Use    Smoking status: Never Smoker    Smokeless tobacco: Never Used   Substance Use Topics    Alcohol use: Never     Frequency: Never    Drug use: Never       Vital Signs:   Vitals:    08/28/19 0930   BP: 129/66   Pulse: 91   Resp: 20   Temp: 97.2 °F (36.2 °C)   SpO2: 95%        Physical Exam:  Cardiac:  [x]WNL  []Comments:  Pulmonary:  [x]WNL   []Comments:  Neuro/Mental Status:  [x]WNL  []Comments:  Abdominal:  [x]WNL    []Comments:  Other:   []WNL  []Comments:    Informed Consent:  The risks and benefits of the procedure

## 2019-09-05 NOTE — PROGRESS NOTES
Substance Use Topics    Alcohol use: Never     Frequency: Never    Drug use: Never     Family History:   Family History   Problem Relation Age of Onset    Colon Cancer Neg Hx     Colon Polyps Neg Hx     Esophageal Cancer Neg Hx     Liver Cancer Neg Hx     Liver Disease Neg Hx     Rectal Cancer Neg Hx     Stomach Cancer Neg Hx        Health Maintenance   Topic Date Due    HIV screen  05/08/1983    DTaP/Tdap/Td vaccine (1 - Tdap) 05/08/1987    Cervical cancer screen  05/08/1989    Lipid screen  05/08/2008    Diabetes screen  05/08/2008    Breast cancer screen  05/08/2018    Shingles Vaccine (1 of 2) 05/08/2018    Flu vaccine (1) 09/01/2019    Colon cancer screen colonoscopy  08/28/2024    Pneumococcal 0-64 years Vaccine  Aged Out       Subjective   REVIEW OF SYSTEMS:   Review of Systems   Constitutional: Positive for fatigue. Negative for chills, diaphoresis, fever and unexpected weight change. HENT: Negative for mouth sores, nosebleeds, sore throat, trouble swallowing and voice change. Positive for dry mouth   Eyes: Negative for photophobia, discharge and itching. Positive for dry eyes   Respiratory: Negative for cough, shortness of breath and wheezing. Cardiovascular: Negative for chest pain, palpitations and leg swelling. Gastrointestinal: Negative for abdominal distention, abdominal pain, blood in stool, constipation, diarrhea, nausea and vomiting. Endocrine: Negative for cold intolerance, heat intolerance, polydipsia and polyuria. Genitourinary: Negative for difficulty urinating, dysuria, hematuria and urgency. Musculoskeletal: Positive for arthralgias, back pain and joint swelling. Negative for myalgias. Skin: Negative for color change and rash. Neurological: Negative for dizziness, tremors, seizures, syncope and light-headedness. Hematological: Negative for adenopathy. Bruises/bleeds easily (Bilateral arm bruises post IVs from GI work-up). daily.    Follow-up CBC on 8/20/2019 revealed a WBC of 9.9, Hgb 9.9, MCV 76.7, MCHC 30, ,000. PRIOR CBCS  1/20/2018, WBC 11.9, Hgb 12.5, MCV 87.9, ,000    1/18/2017, WBC 10.4 with normal percent differential, Hgb 12.6, MCV 89.3, ,000    3/28/2017, WBC 10.1, Hgb 13, MCV 90.8, ,000    Endoscopy was performed 8/27/2019 by Dr. Steffi Carter was negative except for some diminutive gastric polyps with small bowel biopsies negative for sprue. Colonoscopy performed 8/28/2019 by Dr. Steffi Carter revealed left-sided diverticulosis, grade 1 internal hemorrhoids. Small bowel evaluation has not been performed yet. She denies getting a urinalysis or checking stool for blood. She is tolerating her 1 iron pill a day. Her CBC today reveals a WBC of 11.9, Hgb 10.2, MCV 80.5, MCHC 29.1, PLT of 270,000. She appears to be responding to her oral iron replacement therapy. I am going to increase her Niferex to twice a day dosing. I encouraged her to take along with vitamin C to help absorption. She no longer menstruates. PLAN  · Continue oral iron placement therapy but increase to twice a day -new prescription electronically sent to her pharmacy  · Stool for blood x3 -if positive have GI to complete small bowel evaluation  · Urinalysis to evaluate for hematuria  · Follow-up with Dr. Johnny Puente in 3 months in [place] [id number] This Encounter   Procedures    Blood Occult Stool Screen #1     X 3 today     Standing Status:   Future     Number of Occurrences:   1     Standing Expiration Date:   9/6/2020    Urinalysis Reflex to Culture     Standing Status:   Future     Number of Occurrences:   1     Standing Expiration Date:   9/6/2020     Order Specific Question:   SPECIFY(EX-CATH,MIDSTREAM,CYSTO,ETC)? Answer:   iron deficiency anemia       Return in about 3 months (around 12/6/2019) for With Dr. Johnny Puente in [place].       Hermelinda Graf PA-C  4:58 PM  9/6/2019

## 2019-09-06 ENCOUNTER — OFFICE VISIT (OUTPATIENT)
Dept: HEMATOLOGY | Age: 49
End: 2019-09-06
Payer: COMMERCIAL

## 2019-09-06 ENCOUNTER — HOSPITAL ENCOUNTER (OUTPATIENT)
Dept: INFUSION THERAPY | Age: 49
Discharge: HOME | End: 2019-09-06
Payer: COMMERCIAL

## 2019-09-06 VITALS
DIASTOLIC BLOOD PRESSURE: 100 MMHG | BODY MASS INDEX: 39.79 KG/M2 | OXYGEN SATURATION: 95 % | WEIGHT: 238.8 LBS | HEIGHT: 65 IN | SYSTOLIC BLOOD PRESSURE: 160 MMHG | HEART RATE: 90 BPM

## 2019-09-06 DIAGNOSIS — D50.9 IRON DEFICIENCY ANEMIA, UNSPECIFIED IRON DEFICIENCY ANEMIA TYPE: Primary | ICD-10-CM

## 2019-09-06 DIAGNOSIS — M35.9 AUTOIMMUNE DISEASE (HCC): ICD-10-CM

## 2019-09-06 PROCEDURE — 81003 URINALYSIS AUTO W/O SCOPE: CPT | Performed by: PHYSICIAN ASSISTANT

## 2019-09-06 PROCEDURE — 85025 COMPLETE CBC W/AUTO DIFF WBC: CPT

## 2019-09-06 PROCEDURE — 99203 OFFICE O/P NEW LOW 30 MIN: CPT | Performed by: PHYSICIAN ASSISTANT

## 2019-09-06 RX ORDER — IRON ASPGLY,PS/C/SUCCINIC ACID 150-50-50
1 CAPSULE ORAL 2 TIMES DAILY
Qty: 180 CAPSULE | Refills: 1 | Status: SHIPPED | OUTPATIENT
Start: 2019-09-06 | End: 2019-12-03

## 2019-09-06 ASSESSMENT — ENCOUNTER SYMPTOMS
WHEEZING: 0
EYE DISCHARGE: 0
BLOOD IN STOOL: 0
TROUBLE SWALLOWING: 0
VOMITING: 0
ABDOMINAL PAIN: 0
PHOTOPHOBIA: 0
COLOR CHANGE: 0
COUGH: 0
NAUSEA: 0
EYE ITCHING: 0
ABDOMINAL DISTENTION: 0
BACK PAIN: 1
DIARRHEA: 0
SORE THROAT: 0
SHORTNESS OF BREATH: 0
CONSTIPATION: 0
VOICE CHANGE: 0

## 2019-12-03 ENCOUNTER — OFFICE VISIT (OUTPATIENT)
Dept: HEMATOLOGY | Age: 49
End: 2019-12-03
Payer: COMMERCIAL

## 2019-12-03 VITALS
SYSTOLIC BLOOD PRESSURE: 188 MMHG | HEART RATE: 96 BPM | HEIGHT: 65 IN | RESPIRATION RATE: 16 BRPM | DIASTOLIC BLOOD PRESSURE: 86 MMHG | BODY MASS INDEX: 40.32 KG/M2 | WEIGHT: 242 LBS

## 2019-12-03 DIAGNOSIS — M35.9 AUTOIMMUNE DISEASE (HCC): Primary | ICD-10-CM

## 2019-12-03 DIAGNOSIS — Z71.89 CARE PLAN DISCUSSED WITH PATIENT: ICD-10-CM

## 2019-12-03 DIAGNOSIS — D50.9 IRON DEFICIENCY ANEMIA, UNSPECIFIED IRON DEFICIENCY ANEMIA TYPE: ICD-10-CM

## 2019-12-03 PROCEDURE — 99213 OFFICE O/P EST LOW 20 MIN: CPT | Performed by: INTERNAL MEDICINE

## 2020-01-06 PROBLEM — D50.9 IRON DEFICIENCY ANEMIA: Status: ACTIVE | Noted: 2020-01-06

## 2020-01-06 NOTE — PROGRESS NOTES
Physical activity:     Days per week: None     Minutes per session: None    Stress: None   Relationships    Social connections:     Talks on phone: None     Gets together: None     Attends Mosque service: None     Active member of club or organization: None     Attends meetings of clubs or organizations: None     Relationship status: None    Intimate partner violence:     Fear of current or ex partner: None     Emotionally abused: None     Physically abused: None     Forced sexual activity: None   Other Topics Concern    None   Social History Narrative    None       FAMILY HISTORY:  Family History   Problem Relation Age of Onset    Colon Cancer Neg Hx     Colon Polyps Neg Hx     Esophageal Cancer Neg Hx     Liver Cancer Neg Hx     Liver Disease Neg Hx     Rectal Cancer Neg Hx     Stomach Cancer Neg Hx         Current Outpatient Medications   Medication Sig Dispense Refill    DULoxetine (CYMBALTA) 60 MG extended release capsule Take 60 mg by mouth every morning      Coenzyme Q10 (CO Q10) 100 MG CAPS Take by mouth daily      ondansetron (ZOFRAN) 4 MG tablet Take 1 tablet by mouth every 8 hours as needed for Nausea or Vomiting 3 tablet 0    Ascorbic Acid (VITAMIN C) 1000 MG tablet Take 1,000 mg by mouth daily      calcium acetate-magnesium carb 450-200 MG TABS Take by mouth 2 times daily      omeprazole (PRILOSEC) 20 MG delayed release capsule Take 20 mg by mouth daily      Fe Bisgly-Succ-C-Thre-B12-FA (IRON-150 PO) Take by mouth daily      predniSONE (DELTASONE) 10 MG tablet Take 10 mg by mouth daily      hydroxychloroquine (PLAQUENIL) 200 MG tablet Take by mouth 2 times daily       gabapentin (NEURONTIN) 300 MG capsule Take 300 mg by mouth 3 times daily.       Levothyroxine Sodium 125 MCG CAPS Take by mouth Daily      folic acid (FOLVITE) 1 MG tablet Take 1 mg by mouth daily      DULoxetine (CYMBALTA) 30 MG extended release capsule Take 30 mg by mouth nightly        No current facility-administered medications for this visit. REVIEW OF SYSTEMS:    Constitutional: no fever, no night sweats, fatigue;   HEENT: no blurring of vision, no double vision, no hearing difficulty, no tinnitus,no ulceration, no dysphagia  Lungs: no cough, no shortness of breath, no wheeze;   CVS: no palpitation, no chest pain, no shortness of breath;  GI:  abdominal pain, nausea , no vomiting,  constipation;   JEREMY: no dysuria, frequency and urgency, no hematuria, no kidney stones;   Musculoskeletal: no joint pain, swelling , stiffness;   Endocrine: no polyuria, polydypsia, no cold or heat intolerence; Hematology/lymphatic: no easy brusing or bleeding, no hx of clotting disorder; no peripheral adenopathy. Dermatology: no skin rash, no eczema, no pruritis;   Psychiatry: no depression, no anxiety,no panic attacks, no suicide ideation; Neurology: no syncope, no seizures, no numbness or tingling of hands, no numbness or tingling of feet, no paresis;     PHYSICAL EXAM:    Vitals signs:  BP (!) 160/90   Pulse 80   Ht 5' 5\" (1.651 m)   Wt 251 lb (113.9 kg)   SpO2 97%   BMI 41.77 kg/m²    Pain scale:  Pain Score:   0 - No pain     CONSTITUTIONAL: Alert, appropriate, no acute distress,   EYES: Non icteric, EOM intact, pupils equal round and reactive to light and accommodation. ENT: Oral mucus membranes moist, no oral pharyngeal lesions. External inspection of ears and nose are normal.   NECK: Supple, no masses. No palpable thyroid mass    CHEST/LUNGS: CTA bilaterally, normal respiratory effort   CARDIOVASCULAR: RRR, no murmurs. No lower extremity edema   ABDOMEN: soft non-tender, active bowel sounds, no hepatosplenomegaly. No palpable masses. EXTREMITIES: warm, Full ROM of all fours extremities. No focal weakness. SKIN: warm, dry with no rashes or lesions  LYMPH: No cervical, clavicular, axillary, or inguinal lymphadenopathy  NEUROLOGIC: follows commands, non focal.   PSYCH: mood and affect appropriate. Alert and oriented to time and place and person. Relevant Lab findings/reviewed by me:  CBC:  WBC-12.4  HGB-11.5/MCV80  PLT-355  Neut-10.8    1/3/2020- laboratory studies showed CBC showed WBC 12.4, hemoglobin 11.5/MCV 80, RDW 18, platelet counts 162,862. Iron profile showed iron 44, TIBC 419, U , iron saturation 11%, ferritin 10. CMP showed normal kidney function creatinine 0.8. LFTs normal.  Otherwise unremarkable. Relevant Imaging studies/reviewed by me:  No results found. ASSESSMENT:    No orders of the defined types were placed in this encounter. Don Aranda was seen today for anemia. Diagnoses and all orders for this visit:    Iron deficiency anemia, unspecified iron deficiency anemia type       #Iron deficiency anemia- iron profile consistent with iron deficiency anemia. Upper and lower endoscopy was unrevealing. She did not have a capsule endoscopy performed. Occult blood x3. At this point the patient has had very poor tolerance to PO iron replacement. Recommended IV iron replacement with Injectafer 750 mg x 2 dose. I believe that her chronic use of omeprazole is likely to exacerbate her iron deficiency. The patient has tried to stop her omeprazole in the past without success. #Gastroesophageal reflux disease-the patient is currently on omeprazole 20 mg daily. #Hypothyroidism-continue levothyroxine 125 mg daily. Plan:  · Arrange for IV iron replacement Injectafer 750 mg x 2 dose. · RTC 6 weeks. Follow Up:      Return in about 6 weeks (around 2/18/2020) for an luther with Dr. Carmelo Oshea. Data [facility] Andrea Rodriguez am scribing for Eleno Bernal MD. Electronically signed by Andrea Rodriguez on 1/7/2020 at 10:17 AM.     I, Dr Leopoldo Lemme, personally performed the services described in this documentation as scribed by Andrea Rodriguez MA in my presence and is both accurate and complete.   Over 50% of the total visit time of 25 minutes in face to face

## 2020-01-07 ENCOUNTER — OFFICE VISIT (OUTPATIENT)
Dept: HEMATOLOGY | Age: 50
End: 2020-01-07
Payer: COMMERCIAL

## 2020-01-07 VITALS
DIASTOLIC BLOOD PRESSURE: 90 MMHG | HEART RATE: 80 BPM | BODY MASS INDEX: 41.82 KG/M2 | HEIGHT: 65 IN | SYSTOLIC BLOOD PRESSURE: 160 MMHG | OXYGEN SATURATION: 97 % | WEIGHT: 251 LBS

## 2020-01-07 PROCEDURE — 99214 OFFICE O/P EST MOD 30 MIN: CPT | Performed by: INTERNAL MEDICINE

## 2020-01-13 RX ORDER — HEPARIN SODIUM (PORCINE) LOCK FLUSH IV SOLN 100 UNIT/ML 100 UNIT/ML
500 SOLUTION INTRAVENOUS PRN
Status: CANCELLED | OUTPATIENT
Start: 2020-01-20

## 2020-01-13 RX ORDER — EPINEPHRINE 1 MG/ML
0.3 INJECTION, SOLUTION, CONCENTRATE INTRAVENOUS PRN
Status: CANCELLED | OUTPATIENT
Start: 2020-01-20

## 2020-01-13 RX ORDER — SODIUM CHLORIDE 9 MG/ML
INJECTION, SOLUTION INTRAVENOUS CONTINUOUS
Status: CANCELLED | OUTPATIENT
Start: 2020-01-20

## 2020-01-13 RX ORDER — SODIUM CHLORIDE 0.9 % (FLUSH) 0.9 %
5 SYRINGE (ML) INJECTION PRN
Status: CANCELLED | OUTPATIENT
Start: 2020-01-20

## 2020-01-13 RX ORDER — SODIUM CHLORIDE 0.9 % (FLUSH) 0.9 %
10 SYRINGE (ML) INJECTION PRN
Status: CANCELLED | OUTPATIENT
Start: 2020-01-20

## 2020-01-13 RX ORDER — METHYLPREDNISOLONE SODIUM SUCCINATE 125 MG/2ML
125 INJECTION, POWDER, LYOPHILIZED, FOR SOLUTION INTRAMUSCULAR; INTRAVENOUS ONCE
Status: CANCELLED | OUTPATIENT
Start: 2020-01-20

## 2020-01-13 RX ORDER — DIPHENHYDRAMINE HYDROCHLORIDE 50 MG/ML
50 INJECTION INTRAMUSCULAR; INTRAVENOUS ONCE
Status: CANCELLED | OUTPATIENT
Start: 2020-01-20

## 2020-01-21 ENCOUNTER — HOSPITAL ENCOUNTER (OUTPATIENT)
Dept: INFUSION THERAPY | Age: 50
Setting detail: INFUSION SERIES
Discharge: HOME | End: 2020-01-21
Payer: COMMERCIAL

## 2020-01-21 VITALS
HEART RATE: 84 BPM | SYSTOLIC BLOOD PRESSURE: 143 MMHG | TEMPERATURE: 98 F | OXYGEN SATURATION: 100 % | DIASTOLIC BLOOD PRESSURE: 86 MMHG | RESPIRATION RATE: 16 BRPM

## 2020-01-21 DIAGNOSIS — D50.8 OTHER IRON DEFICIENCY ANEMIA: Primary | ICD-10-CM

## 2020-01-21 PROCEDURE — 2580000003 HC RX 258: Performed by: INTERNAL MEDICINE

## 2020-01-21 PROCEDURE — 6360000002 HC RX W HCPCS: Performed by: INTERNAL MEDICINE

## 2020-01-21 PROCEDURE — 96365 THER/PROPH/DIAG IV INF INIT: CPT

## 2020-01-21 RX ORDER — DIPHENHYDRAMINE HYDROCHLORIDE 50 MG/ML
50 INJECTION INTRAMUSCULAR; INTRAVENOUS ONCE
Status: CANCELLED | OUTPATIENT
Start: 2020-01-28

## 2020-01-21 RX ORDER — SODIUM CHLORIDE 9 MG/ML
INJECTION, SOLUTION INTRAVENOUS CONTINUOUS
Status: CANCELLED | OUTPATIENT
Start: 2020-01-28

## 2020-01-21 RX ORDER — SODIUM CHLORIDE 0.9 % (FLUSH) 0.9 %
10 SYRINGE (ML) INJECTION PRN
Status: CANCELLED | OUTPATIENT
Start: 2020-01-28

## 2020-01-21 RX ORDER — SODIUM CHLORIDE 9 MG/ML
INJECTION, SOLUTION INTRAVENOUS CONTINUOUS
Status: DISCONTINUED | OUTPATIENT
Start: 2020-01-21 | End: 2020-01-22 | Stop reason: HOSPADM

## 2020-01-21 RX ORDER — EPINEPHRINE 1 MG/ML
0.3 INJECTION, SOLUTION, CONCENTRATE INTRAVENOUS PRN
Status: CANCELLED | OUTPATIENT
Start: 2020-01-28

## 2020-01-21 RX ORDER — HEPARIN SODIUM (PORCINE) LOCK FLUSH IV SOLN 100 UNIT/ML 100 UNIT/ML
500 SOLUTION INTRAVENOUS PRN
Status: CANCELLED | OUTPATIENT
Start: 2020-01-28

## 2020-01-21 RX ORDER — SODIUM CHLORIDE 0.9 % (FLUSH) 0.9 %
5 SYRINGE (ML) INJECTION PRN
Status: CANCELLED | OUTPATIENT
Start: 2020-01-28

## 2020-01-21 RX ORDER — METHYLPREDNISOLONE SODIUM SUCCINATE 125 MG/2ML
125 INJECTION, POWDER, LYOPHILIZED, FOR SOLUTION INTRAMUSCULAR; INTRAVENOUS ONCE
Status: CANCELLED | OUTPATIENT
Start: 2020-01-28

## 2020-01-21 RX ADMIN — FERRIC CARBOXYMALTOSE INJECTION 750 MG: 50 INJECTION, SOLUTION INTRAVENOUS at 13:21

## 2020-01-28 ENCOUNTER — HOSPITAL ENCOUNTER (OUTPATIENT)
Dept: INFUSION THERAPY | Age: 50
Setting detail: INFUSION SERIES
Discharge: HOME | End: 2020-01-28
Payer: COMMERCIAL

## 2020-01-28 VITALS
RESPIRATION RATE: 17 BRPM | WEIGHT: 246 LBS | TEMPERATURE: 97.9 F | DIASTOLIC BLOOD PRESSURE: 69 MMHG | BODY MASS INDEX: 40.94 KG/M2 | SYSTOLIC BLOOD PRESSURE: 108 MMHG | HEART RATE: 91 BPM

## 2020-01-28 DIAGNOSIS — D50.8 OTHER IRON DEFICIENCY ANEMIA: Primary | ICD-10-CM

## 2020-01-28 PROCEDURE — 2580000003 HC RX 258: Performed by: INTERNAL MEDICINE

## 2020-01-28 PROCEDURE — 6360000002 HC RX W HCPCS: Performed by: INTERNAL MEDICINE

## 2020-01-28 PROCEDURE — 96365 THER/PROPH/DIAG IV INF INIT: CPT

## 2020-01-28 RX ORDER — DIPHENHYDRAMINE HYDROCHLORIDE 50 MG/ML
50 INJECTION INTRAMUSCULAR; INTRAVENOUS ONCE
Status: CANCELLED | OUTPATIENT
Start: 2020-01-28

## 2020-01-28 RX ORDER — HEPARIN SODIUM (PORCINE) LOCK FLUSH IV SOLN 100 UNIT/ML 100 UNIT/ML
500 SOLUTION INTRAVENOUS PRN
Status: CANCELLED | OUTPATIENT
Start: 2020-01-28

## 2020-01-28 RX ORDER — SODIUM CHLORIDE 9 MG/ML
INJECTION, SOLUTION INTRAVENOUS CONTINUOUS
Status: DISCONTINUED | OUTPATIENT
Start: 2020-01-28 | End: 2020-01-29 | Stop reason: HOSPADM

## 2020-01-28 RX ORDER — EPINEPHRINE 1 MG/ML
0.3 INJECTION, SOLUTION, CONCENTRATE INTRAVENOUS PRN
Status: CANCELLED | OUTPATIENT
Start: 2020-01-28

## 2020-01-28 RX ORDER — SODIUM CHLORIDE 0.9 % (FLUSH) 0.9 %
10 SYRINGE (ML) INJECTION PRN
Status: CANCELLED | OUTPATIENT
Start: 2020-01-28

## 2020-01-28 RX ORDER — SODIUM CHLORIDE 9 MG/ML
INJECTION, SOLUTION INTRAVENOUS CONTINUOUS
Status: CANCELLED | OUTPATIENT
Start: 2020-01-28

## 2020-01-28 RX ORDER — SODIUM CHLORIDE 0.9 % (FLUSH) 0.9 %
5 SYRINGE (ML) INJECTION PRN
Status: CANCELLED | OUTPATIENT
Start: 2020-01-28

## 2020-01-28 RX ORDER — METHYLPREDNISOLONE SODIUM SUCCINATE 125 MG/2ML
125 INJECTION, POWDER, LYOPHILIZED, FOR SOLUTION INTRAMUSCULAR; INTRAVENOUS ONCE
Status: CANCELLED | OUTPATIENT
Start: 2020-01-28

## 2020-01-28 RX ADMIN — FERRIC CARBOXYMALTOSE INJECTION 750 MG: 50 INJECTION, SOLUTION INTRAVENOUS at 13:28

## 2020-02-18 ENCOUNTER — OFFICE VISIT (OUTPATIENT)
Dept: HEMATOLOGY | Age: 50
End: 2020-02-18
Payer: COMMERCIAL

## 2020-02-18 VITALS
BODY MASS INDEX: 41.93 KG/M2 | DIASTOLIC BLOOD PRESSURE: 90 MMHG | RESPIRATION RATE: 20 BRPM | OXYGEN SATURATION: 98 % | SYSTOLIC BLOOD PRESSURE: 158 MMHG | WEIGHT: 252 LBS | HEART RATE: 105 BPM

## 2020-02-18 PROCEDURE — 99214 OFFICE O/P EST MOD 30 MIN: CPT | Performed by: INTERNAL MEDICINE

## 2020-02-18 NOTE — PROGRESS NOTES
345,000.     PRIOR CBCS  1/20/2018, WBC 11.9, Hgb 12.5, MCV 87.9, ,000     1/18/2017, WBC 10.4 with normal percent differential, Hgb 12.6, MCV 89.3, ,000     3/28/2017, WBC 10.1, Hgb 13, MCV 90.8, ,000     Endoscopy was performed 8/27/2019 by Dr. Le was negative except for some diminutive gastric polyps with small bowel biopsies negative for sprue.     Colonoscopy performed 8/28/2019 by Dr. Le revealed left-sided diverticulosis, grade 1 internal hemorrhoids.     Small bowel evaluation has not been performed yet.     Occult bloody stools x3- negative    1/3/2020- laboratory studies showed CBC showed WBC 12.4, hemoglobin 11.5/MCV 80, RDW 18, platelet counts 561,454. Iron profile showed iron 44, TIBC 419, U , iron saturation 11%, ferritin 10. CMP showed normal kidney function creatinine 0.8. LFTs normal.  Otherwise unremarkable. 1/7/2020-poor tolerance to p.o. iron with complains of abdominal pain, nausea constipation. Therefore recommend IV iron replacement with Injectafer 750mg x 2 doses. 2/18/2020-WBC 10.3, hemoglobin 13.2/MCV 86, platelets 115,505. Therefore, improvement of her anemia.         PAST MEDICAL HISTORY:   Past Medical History:   Diagnosis Date    Anemia     Arthritis     RA    Fibromyalgia     GERD (gastroesophageal reflux disease)     Hypothyroidism     hypothyroidism    Iron deficiency anemia 1/6/2020    [facility]upus ([facility] [address]     Sjogren's disease ([facility] [address]           PAST SURGICAL HISTORY:  Past Surgical History:   Procedure Laterality Date    CHOLECYSTECTOMY      COLONOSCOPY N/A 8/27/2019    Dr Nicole Hennessy to 8/28/19 due to prep    COLONOSCOPY N/A 8/28/2019    Dr Skylar Rachel hemorrhoids-Grade 1, suboptimal prep, diverticulosis, 5 yr recall    OVARY REMOVAL Right     UPPER GASTROINTESTINAL ENDOSCOPY N/A 8/27/2019    Dr Dalton Aquino appearing gastric polyps        SOCIAL HISTORY:  Social History     Socioeconomic History  Marital status:      Spouse name: None    Number of children: None    Years of education: None    Highest education level: None   Occupational History    None   Social Needs    Financial resource strain: None    Food insecurity:     Worry: None     Inability: None    Transportation needs:     Medical: None     Non-medical: None   Tobacco Use    Smoking status: Never Smoker    Smokeless tobacco: Never Used   Substance and Sexual Activity    Alcohol use: Never     Frequency: Never    Drug use: Never    Sexual activity: None   Lifestyle    Physical activity:     Days per week: None     Minutes per session: None    Stress: None   Relationships    Social connections:     Talks on phone: None     Gets together: None     Attends Pent[employer]ism service: None     Active member of club or organization: None     Attends meetings of clubs or organizations: None     Relationship status: None    Intimate partner violence:     Fear of current or ex partner: None     Emotionally abused: None     Physically abused: None     Forced sexual activity: None   Other Topics Concern    None   Social History Narrative    None       FAMILY HISTORY:  Family History   Problem Relation Age of Onset    Colon Cancer Neg Hx     Colon Polyps Neg Hx     Esophageal Cancer Neg Hx     Liver Cancer Neg Hx     Liver Disease Neg Hx     Rectal Cancer Neg Hx     Stomach Cancer Neg Hx         Current Outpatient Medications   Medication Sig Dispense Refill    DULoxetine (CYMBALTA) 60 MG extended release capsule Take 60 mg by mouth every morning      Coenzyme Q10 (CO Q10) 100 MG CAPS Take by mouth daily      ondansetron (ZOFRAN) 4 MG tablet Take 1 tablet by mouth every 8 hours as needed for Nausea or Vomiting 3 tablet 0    Ascorbic Acid (VITAMIN C) 1000 MG tablet Take 1,000 mg by mouth daily      calcium acetate-magnesium carb 450-200 MG TABS Take by mouth 2 times daily      omeprazole (PRILOSEC) 20 MG delayed release

## 2021-05-17 DIAGNOSIS — D50.8 OTHER IRON DEFICIENCY ANEMIA: Primary | ICD-10-CM

## 2021-05-19 NOTE — PROGRESS NOTES
CBCS  · 1/20/2018, WBC 11.9, Hgb 12.5, MCV 87.9, ,000  · 1/18/2017, WBC 10.4 with normal percent differential, Hgb 12.6, MCV 89.3, ,000  · 3/28/2017, WBC 10.1, Hgb 13, MCV 90.8, ,000  · 8/27/19 Endoscopy was performed by Dr. Maty Wilkerson was negative except for some diminutive gastric polyps with small bowel biopsies negative for sprue. · 8/28/19 Colonoscopy performed by Dr. Maty Wilkerson revealed left-sided diverticulosis, grade 1 internal hemorrhoids. · Small bowel evaluation has not been performed yet. · Occult bloody stools x3 negative  · 1/3/2020 laboratory studies showed CBC showed WBC 12.4, hemoglobin 11.5/MCV 80, RDW 18, platelet counts 046,526. Iron profile showed iron 44, TIBC 419, U , iron saturation 11%, ferritin 10. CMP showed normal kidney function creatinine 0.8. LFTs normal.  Otherwise unremarkable. · 1/7/2020poor tolerance to p.o. iron with complains of abdominal pain, nausea constipation. Therefore recommend IV iron replacement with Injectafer 750mg x 2 doses. · 2/18/2020WBC 10.3, hemoglobin 13.2/MCV 86, platelets 144,112. Therefore, improvement of her anemia.     PAST MEDICAL HISTORY:   Past Medical History:   Diagnosis Date    Anemia     Arthritis     RA    Fibromyalgia     GERD (gastroesophageal reflux disease)     Hypothyroidism     hypothyroidism    Iron deficiency anemia 1/6/2020    Lupus ([facility] [address]     Sjogren's disease ([facility] [address]           PAST SURGICAL HISTORY:  Past Surgical History:   Procedure Laterality Date    CHOLECYSTECTOMY      COLONOSCOPY N/A 8/27/2019    Dr Jessica Rizvi to 8/28/19 due to prep    COLONOSCOPY N/A 8/28/2019    Dr Luz Andrews hemorrhoids-Grade 1, suboptimal prep, diverticulosis, 5 yr recall    OVARY REMOVAL Right     UPPER GASTROINTESTINAL ENDOSCOPY N/A 8/27/2019    Dr Ranjana Mak appearing gastric polyps        SOCIAL HISTORY:  Social History     Socioeconomic History    Marital status:  Spouse name: None    Number of children: None    Years of education: None    Highest education level: None   Occupational History    None   Tobacco Use    Smoking status: Never Smoker    Smokeless tobacco: Never Used   Vaping Use    Vaping Use: Never used   Substance and Sexual Activity    Alcohol use: Never    Drug use: Never    Sexual activity: None   Other Topics Concern    None   Social History Narrative    None     Social Determinants of Health     Financial Resource Strain:     Difficulty of Paying Living Expenses:    Food Insecurity:     Worried About Running Out of Food in the Last Year:     Ran Out of Food in the Last Year:    Transportation Needs:     Lack of Transportation (Medical):      Lack of Transportation (Non-Medical):    Physical Activity:     Days of Exercise per Week:     Minutes of Exercise per Session:    Stress:     Feeling of Stress :    Social Connections:     Frequency of Communication with Friends and Family:     Frequency of Social Gatherings with Friends and Family:     Attends [employer] Services:     Active Member of Clubs or Organizations:     Attends Club or Organization Meetings:     Marital Status:    Intimate Partner Violence:     Fear of Current or Ex-Partner:     Emotionally Abused:     Physically Abused:     Sexually Abused:        FAMILY HISTORY:  Family History   Problem Relation Age of Onset    Colon Cancer Neg Hx     Colon Polyps Neg Hx     Esophageal Cancer Neg Hx     Liver Cancer Neg Hx     Liver Disease Neg Hx     Rectal Cancer Neg Hx     Stomach Cancer Neg Hx         Current Outpatient Medications   Medication Sig Dispense Refill    hydroCHLOROthiazide (MICROZIDE) 12.5 MG capsule       DULoxetine (CYMBALTA) 60 MG extended release capsule Take 60 mg by mouth every morning      Coenzyme Q10 (CO Q10) 100 MG CAPS Take by mouth daily      ondansetron (ZOFRAN) 4 MG tablet Take 1 tablet by mouth every 8 hours as needed for Nausea or Vomiting 3 tablet 0    Ascorbic Acid (VITAMIN C) 1000 MG tablet Take 1,000 mg by mouth daily      calcium acetate-magnesium carb 450-200 MG TABS Take by mouth 2 times daily      omeprazole (PRILOSEC) 20 MG delayed release capsule Take 20 mg by mouth daily      Fe Bisgly-Succ-C-Thre-B12-FA (IRON-150 PO) Take by mouth daily      predniSONE (DELTASONE) 10 MG tablet Take 10 mg by mouth daily      hydroxychloroquine (PLAQUENIL) 200 MG tablet Take by mouth 2 times daily       gabapentin (NEURONTIN) 300 MG capsule Take 300 mg by mouth 3 times daily.  Levothyroxine Sodium 125 MCG CAPS Take 150 mcg by mouth Daily       folic acid (FOLVITE) 1 MG tablet Take 1 mg by mouth daily      DULoxetine (CYMBALTA) 30 MG extended release capsule Take 30 mg by mouth nightly        No current facility-administered medications for this visit. REVIEW OF SYSTEMS:    Constitutional: no fever, no night sweats, fatigue;   HEENT: no blurring of vision, no double vision, no hearing difficulty, no tinnitus,no ulceration, no dysphagia, snoring   Lungs: no cough, exertional shortness of breath, no wheeze;   CVS: no palpitation, no chest pain, exertional shortness of breath;  GI:  No abdominal pain, no nausea , no vomiting,  No constipation;   JEREMY: no dysuria, frequency and urgency, no hematuria, no kidney stones;   Musculoskeletal: no joint pain, swelling , stiffness;   Endocrine: no polyuria, polydypsia, no cold or heat intolerence; Hematology/lymphatic: no easy brusing or bleeding, no hx of clotting disorder; no peripheral adenopathy. Dermatology: no skin rash, no eczema, no pruritis;   Psychiatry: no depression, no anxiety,no panic attacks, no suicide ideation;    Neurology: no syncope, no seizures, no numbness or tingling of hands, no numbness or tingling of feet, no paresis;     PHYSICAL EXAM:    Vitals signs:  BP (!) 160/110   Pulse 107   Temp 97.3 °F (36.3 °C)   Ht 5' 5\" (1.651 m)   Wt 251 lb 11.2 oz (114.2 kg) SpO2 99%   BMI 41.89 kg/m²    Pain scale:  Pain Score:   7     CONSTITUTIONAL: Alert, appropriate, no acute distress, obese  EYES: Non icteric, EOM intact, pupils equal round and reactive to light and accommodation. ENT: Oral mucus membranes moist, no oral pharyngeal lesions. External inspection of ears and nose are normal.   NECK: Supple, no masses. No palpable thyroid mass    CHEST/LUNGS: CTA bilaterally, normal respiratory effort   CARDIOVASCULAR: RRR, no murmurs. No lower extremity edema   ABDOMEN: soft non-tender, active bowel sounds, no hepatosplenomegaly. No palpable masses. EXTREMITIES: warm, Full ROM of all fours extremities. No focal weakness. SKIN: warm, dry with no rashes or lesions  LYMPH: No cervical, clavicular, axillary, or inguinal lymphadenopathy  NEUROLOGIC: follows commands, non focal.   PSYCH: mood and affect appropriate. Alert and oriented to time and place and person. Relevant Lab findings/reviewed by me:  5/17/21 CBC[facility]  WBC 10.8  Hgb 10.2  Hct 33.7  Plt 383    CMP WNL except for Gluc 125, CO2 35    ESR 33  CRP 14  Iron 25  TIBC 414  UIBC 389  Iron Sat 6%  Folate >20  B12 1258  T4 Free 1.22  TSH 0.288    5/20/21 CBC  WBC 15.47  HGB 10.7  HCT 38.0  MCV 79.8      Relevant Imaging studies/reviewed by me:  No results found.     ASSESSMENT:    Orders Placed This Encounter   Procedures    Blood Occult Stool Screen #1     Standing Status:   Future     Standing Expiration Date:   5/20/2022    Blood Occult Stool Screen #2     Standing Status:   Future     Standing Expiration Date:   5/20/2022    Blood Occult Stool Screen #3     Standing Status:   Future     Standing Expiration Date:   5/20/2022    CBC Auto Differential     PLEASE FAX RESULTS TO DR Alicia Reed [phone]     Standing Status:   Future     Standing Expiration Date:   5/20/2022    Iron and TIBC     PLEASE FAX RESULTS TO DR Alicia Reed [phone]     Standing Status:   Future     Standing Expiration Date: 5/20/2022     Order Specific Question:   Is Patient Fasting? Answer:   no     Order Specific Question:   No of Hours? Answer:   0    Ferritin     PLEASE FAX RESULTS TO DR Fausto Real [phone]     Standing Status:   Future     Standing Expiration Date:   5/20/2022      Rob Campbell was seen today for follow-up. Diagnoses and all orders for this visit:    Other iron deficiency anemia  -     CBC Auto Differential; Future  -     Iron and TIBC; Future  -     Ferritin; Future    Autoimmune disease ([facility] [address]    Lupus ([facility] [address]    Anemia, unspecified type  -     Blood Occult Stool Screen #1; Future  -     Blood Occult Stool Screen #2; Future  -     Blood Occult Stool Screen #3; Future  -     CBC Auto Differential; Future  -     Iron and TIBC; Future  -     Ferritin; Future       #Iron deficiency anemia iron profile consistent with iron deficiency anemia. Upper and lower endoscopy was unrevealing. She did not have a capsule endoscopy performed. Occult blood x3 in 2020. Patient received IV iron therapy last year. She had very poor tolerance to PO iron replacement. She received IV iron replacement with Injectafer 750 mg x 2 dose February 2020. Iron profile now with severe iron deficiency. Repeat IV Injectafer. #At risk for sleep apnea she is to follow-up with her PCP for further work-up of these. #Hypertensionuncontrolled. Follow-up with primary care physician. #Dyspnea on exertion I would recommend a 2D echo and if unremarkable to be seen by pulmonary. #Gastroesophageal reflux diseasethe patient is currently on omeprazole 20 mg daily. #Hypothyroidismcontinue levothyroxine 125 mg daily. Plan:  Follow-up PCP   Stool occult blood x3  Recommend injectafer 750mg IV x2 doses  RTC with MD 6 weeks in [facility] office    Follow Up:      Return in about 6 weeks (around 7/1/2021) for Appointment with Dr. Safia Parsons in [facility] office.    RTC 1 week & 2 weeks for IV Injectafer x2 doses     Jolanta SIMMONS am scribing for Marysol Soliman MD. Electronically signed by Chaz Brambila RN on 5/20/2021 at 10:26 AM CDT. I, Dr Shey Mueller, personally performed the services described in this documentation as scribed by Chaz Brambila RN in my presence and is both accurate and complete. I have seen, examined and reviewed this patient medication list, appropriate labs and imaging studies. I reviewed relevant medical records and others physicians notes. I discussed the plans of care with the patient. I answered all the questions to the patients satisfaction. I have also reviewed the chief complaint (CC) and part of the history (History of Present Illness (HPI), Past Family Social History [facility]), or Review of Systems (ROS) and made changes when appropriated.        (Please note that portions of this note were completed with a voice recognition program. Efforts were made to edit the dictations but occasionally words are mis-transcribed.)

## 2021-05-20 ENCOUNTER — OFFICE VISIT (OUTPATIENT)
Dept: HEMATOLOGY | Age: 51
End: 2021-05-20
Payer: COMMERCIAL

## 2021-05-20 ENCOUNTER — HOSPITAL ENCOUNTER (OUTPATIENT)
Dept: INFUSION THERAPY | Age: 51
Discharge: HOME | End: 2021-05-20
Payer: COMMERCIAL

## 2021-05-20 VITALS
DIASTOLIC BLOOD PRESSURE: 110 MMHG | HEIGHT: 65 IN | BODY MASS INDEX: 41.94 KG/M2 | HEART RATE: 107 BPM | OXYGEN SATURATION: 99 % | TEMPERATURE: 97.3 F | WEIGHT: 251.7 LBS | SYSTOLIC BLOOD PRESSURE: 160 MMHG

## 2021-05-20 DIAGNOSIS — D64.9 ANEMIA, UNSPECIFIED TYPE: ICD-10-CM

## 2021-05-20 DIAGNOSIS — D50.8 OTHER IRON DEFICIENCY ANEMIA: ICD-10-CM

## 2021-05-20 DIAGNOSIS — M32.9 LUPUS (HCC): ICD-10-CM

## 2021-05-20 DIAGNOSIS — M35.9 AUTOIMMUNE DISEASE (HCC): ICD-10-CM

## 2021-05-20 DIAGNOSIS — D50.8 OTHER IRON DEFICIENCY ANEMIA: Primary | ICD-10-CM

## 2021-05-20 LAB
BASOPHILS ABSOLUTE: 0.09 K/UL (ref 0.01–0.08)
BASOPHILS RELATIVE PERCENT: 0.6 % (ref 0.1–1.2)
EOSINOPHILS ABSOLUTE: 0.27 K/UL (ref 0.04–0.54)
EOSINOPHILS RELATIVE PERCENT: 1.7 % (ref 0.7–7)
HCT VFR BLD CALC: 38 % (ref 34.1–44.9)
HEMOGLOBIN: 10.7 G/DL (ref 11.2–15.7)
LYMPHOCYTES ABSOLUTE: 2.64 K/UL (ref 1.18–3.74)
LYMPHOCYTES RELATIVE PERCENT: 17.1 % (ref 19.3–53.1)
MCH RBC QN AUTO: 22.5 PG (ref 25.6–32.2)
MCHC RBC AUTO-ENTMCNC: 28.2 G/DL (ref 32.3–35.5)
MCV RBC AUTO: 79.8 FL (ref 79.4–94.8)
MONOCYTES ABSOLUTE: 1.03 K/UL (ref 0.24–0.82)
MONOCYTES RELATIVE PERCENT: 6.7 % (ref 4.7–12.5)
NEUTROPHILS ABSOLUTE: 11.44 K/UL (ref 1.56–6.13)
NEUTROPHILS RELATIVE PERCENT: 73.9 % (ref 34–71.1)
PDW BLD-RTO: 16.5 % (ref 11.7–14.4)
PLATELET # BLD: 283 K/UL (ref 182–369)
PMV BLD AUTO: 11.6 FL (ref 7.4–10.4)
RBC # BLD: 4.76 M/UL (ref 3.93–5.22)
WBC # BLD: 15.47 K/UL (ref 3.98–10.04)

## 2021-05-20 PROCEDURE — 99214 OFFICE O/P EST MOD 30 MIN: CPT | Performed by: INTERNAL MEDICINE

## 2021-05-20 PROCEDURE — 85025 COMPLETE CBC W/AUTO DIFF WBC: CPT

## 2021-05-20 PROCEDURE — 99212 OFFICE O/P EST SF 10 MIN: CPT

## 2021-05-20 RX ORDER — HYDROCHLOROTHIAZIDE 12.5 MG/1
12.5 CAPSULE, GELATIN COATED ORAL EVERY MORNING
COMMUNITY
Start: 2021-05-19

## 2021-05-27 RX ORDER — HEPARIN SODIUM (PORCINE) LOCK FLUSH IV SOLN 100 UNIT/ML 100 UNIT/ML
500 SOLUTION INTRAVENOUS PRN
Status: CANCELLED | OUTPATIENT
Start: 2021-05-27

## 2021-05-27 RX ORDER — SODIUM CHLORIDE 9 MG/ML
INJECTION, SOLUTION INTRAVENOUS CONTINUOUS
Status: CANCELLED | OUTPATIENT
Start: 2021-05-27

## 2021-05-27 RX ORDER — SODIUM CHLORIDE 9 MG/ML
25 INJECTION, SOLUTION INTRAVENOUS PRN
Status: CANCELLED | OUTPATIENT
Start: 2021-05-27

## 2021-05-27 RX ORDER — EPINEPHRINE 1 MG/ML
0.3 INJECTION, SOLUTION, CONCENTRATE INTRAVENOUS PRN
Status: CANCELLED | OUTPATIENT
Start: 2021-05-27

## 2021-05-27 RX ORDER — SODIUM CHLORIDE 0.9 % (FLUSH) 0.9 %
5-40 SYRINGE (ML) INJECTION PRN
Status: CANCELLED | OUTPATIENT
Start: 2021-05-27

## 2021-05-27 RX ORDER — DIPHENHYDRAMINE HYDROCHLORIDE 50 MG/ML
50 INJECTION INTRAMUSCULAR; INTRAVENOUS ONCE
Status: CANCELLED | OUTPATIENT
Start: 2021-05-27 | End: 2021-05-27

## 2021-05-27 RX ORDER — METHYLPREDNISOLONE SODIUM SUCCINATE 125 MG/2ML
125 INJECTION, POWDER, LYOPHILIZED, FOR SOLUTION INTRAMUSCULAR; INTRAVENOUS ONCE
Status: CANCELLED | OUTPATIENT
Start: 2021-05-27 | End: 2021-05-27

## 2021-05-28 ENCOUNTER — HOSPITAL ENCOUNTER (OUTPATIENT)
Dept: INFUSION THERAPY | Age: 51
Discharge: HOME | End: 2021-05-28
Payer: COMMERCIAL

## 2021-05-28 VITALS
OXYGEN SATURATION: 99 % | HEART RATE: 96 BPM | SYSTOLIC BLOOD PRESSURE: 142 MMHG | TEMPERATURE: 98.1 F | DIASTOLIC BLOOD PRESSURE: 77 MMHG | HEIGHT: 65 IN | WEIGHT: 255.4 LBS | BODY MASS INDEX: 42.55 KG/M2

## 2021-05-28 DIAGNOSIS — D50.8 OTHER IRON DEFICIENCY ANEMIA: Primary | ICD-10-CM

## 2021-05-28 PROCEDURE — 96365 THER/PROPH/DIAG IV INF INIT: CPT

## 2021-05-28 PROCEDURE — 2580000003 HC RX 258: Performed by: INTERNAL MEDICINE

## 2021-05-28 PROCEDURE — 6360000002 HC RX W HCPCS: Performed by: INTERNAL MEDICINE

## 2021-05-28 RX ORDER — SODIUM CHLORIDE 9 MG/ML
INJECTION, SOLUTION INTRAVENOUS CONTINUOUS
Status: CANCELLED | OUTPATIENT
Start: 2021-06-04

## 2021-05-28 RX ORDER — EPINEPHRINE 1 MG/ML
0.3 INJECTION, SOLUTION, CONCENTRATE INTRAVENOUS PRN
Status: CANCELLED | OUTPATIENT
Start: 2021-06-04

## 2021-05-28 RX ORDER — HEPARIN SODIUM (PORCINE) LOCK FLUSH IV SOLN 100 UNIT/ML 100 UNIT/ML
500 SOLUTION INTRAVENOUS PRN
Status: CANCELLED | OUTPATIENT
Start: 2021-06-04

## 2021-05-28 RX ORDER — SODIUM CHLORIDE 9 MG/ML
INJECTION, SOLUTION INTRAVENOUS CONTINUOUS
Status: DISCONTINUED | OUTPATIENT
Start: 2021-05-28 | End: 2021-05-29 | Stop reason: HOSPADM

## 2021-05-28 RX ORDER — SODIUM CHLORIDE 9 MG/ML
25 INJECTION, SOLUTION INTRAVENOUS PRN
Status: CANCELLED | OUTPATIENT
Start: 2021-06-04

## 2021-05-28 RX ORDER — METHYLPREDNISOLONE SODIUM SUCCINATE 125 MG/2ML
125 INJECTION, POWDER, LYOPHILIZED, FOR SOLUTION INTRAMUSCULAR; INTRAVENOUS ONCE
Status: CANCELLED | OUTPATIENT
Start: 2021-06-04 | End: 2021-06-04

## 2021-05-28 RX ORDER — SODIUM CHLORIDE 0.9 % (FLUSH) 0.9 %
5-40 SYRINGE (ML) INJECTION PRN
Status: CANCELLED | OUTPATIENT
Start: 2021-06-04

## 2021-05-28 RX ORDER — DIPHENHYDRAMINE HYDROCHLORIDE 50 MG/ML
50 INJECTION INTRAMUSCULAR; INTRAVENOUS ONCE
Status: CANCELLED | OUTPATIENT
Start: 2021-06-04 | End: 2021-06-04

## 2021-05-28 RX ADMIN — FERRIC CARBOXYMALTOSE INJECTION 750 MG: 50 INJECTION, SOLUTION INTRAVENOUS at 13:32

## 2021-06-04 ENCOUNTER — HOSPITAL ENCOUNTER (OUTPATIENT)
Dept: INFUSION THERAPY | Age: 51
Discharge: HOME | End: 2021-06-04
Payer: COMMERCIAL

## 2021-06-04 VITALS
SYSTOLIC BLOOD PRESSURE: 152 MMHG | OXYGEN SATURATION: 97 % | TEMPERATURE: 98.7 F | HEART RATE: 101 BPM | DIASTOLIC BLOOD PRESSURE: 85 MMHG | RESPIRATION RATE: 18 BRPM

## 2021-06-04 DIAGNOSIS — D50.8 OTHER IRON DEFICIENCY ANEMIA: Primary | ICD-10-CM

## 2021-06-04 PROCEDURE — 96365 THER/PROPH/DIAG IV INF INIT: CPT

## 2021-06-04 PROCEDURE — 2580000003 HC RX 258: Performed by: INTERNAL MEDICINE

## 2021-06-04 PROCEDURE — 6360000002 HC RX W HCPCS: Performed by: INTERNAL MEDICINE

## 2021-06-04 RX ORDER — SODIUM CHLORIDE 9 MG/ML
25 INJECTION, SOLUTION INTRAVENOUS PRN
Status: CANCELLED | OUTPATIENT
Start: 2021-06-04

## 2021-06-04 RX ORDER — EPINEPHRINE 1 MG/ML
0.3 INJECTION, SOLUTION, CONCENTRATE INTRAVENOUS PRN
Status: CANCELLED | OUTPATIENT
Start: 2021-06-04

## 2021-06-04 RX ORDER — SODIUM CHLORIDE 9 MG/ML
INJECTION, SOLUTION INTRAVENOUS CONTINUOUS
Status: CANCELLED | OUTPATIENT
Start: 2021-06-04

## 2021-06-04 RX ORDER — HEPARIN SODIUM (PORCINE) LOCK FLUSH IV SOLN 100 UNIT/ML 100 UNIT/ML
500 SOLUTION INTRAVENOUS PRN
Status: CANCELLED | OUTPATIENT
Start: 2021-06-04

## 2021-06-04 RX ORDER — METHYLPREDNISOLONE SODIUM SUCCINATE 125 MG/2ML
125 INJECTION, POWDER, LYOPHILIZED, FOR SOLUTION INTRAMUSCULAR; INTRAVENOUS ONCE
Status: CANCELLED | OUTPATIENT
Start: 2021-06-04 | End: 2021-06-04

## 2021-06-04 RX ORDER — DIPHENHYDRAMINE HYDROCHLORIDE 50 MG/ML
50 INJECTION INTRAMUSCULAR; INTRAVENOUS ONCE
Status: CANCELLED | OUTPATIENT
Start: 2021-06-04 | End: 2021-06-04

## 2021-06-04 RX ORDER — SODIUM CHLORIDE 0.9 % (FLUSH) 0.9 %
5-40 SYRINGE (ML) INJECTION PRN
Status: CANCELLED | OUTPATIENT
Start: 2021-06-04

## 2021-06-04 RX ADMIN — FERRIC CARBOXYMALTOSE INJECTION 750 MG: 50 INJECTION, SOLUTION INTRAVENOUS at 14:47

## 2021-06-08 DIAGNOSIS — D50.8 OTHER IRON DEFICIENCY ANEMIA: Primary | ICD-10-CM

## 2021-06-23 ENCOUNTER — OFFICE VISIT (OUTPATIENT)
Dept: GASTROENTEROLOGY | Age: 51
End: 2021-06-23
Payer: COMMERCIAL

## 2021-06-23 VITALS
SYSTOLIC BLOOD PRESSURE: 130 MMHG | DIASTOLIC BLOOD PRESSURE: 70 MMHG | WEIGHT: 250 LBS | BODY MASS INDEX: 41.65 KG/M2 | HEART RATE: 97 BPM | OXYGEN SATURATION: 99 % | HEIGHT: 65 IN

## 2021-06-23 DIAGNOSIS — D64.9 CHRONIC ANEMIA: Primary | ICD-10-CM

## 2021-06-23 PROCEDURE — 99214 OFFICE O/P EST MOD 30 MIN: CPT | Performed by: NURSE PRACTITIONER

## 2021-06-23 ASSESSMENT — ENCOUNTER SYMPTOMS
TROUBLE SWALLOWING: 0
NAUSEA: 0
SORE THROAT: 0
BACK PAIN: 1
COUGH: 0
ANAL BLEEDING: 0
BLOOD IN STOOL: 0
SHORTNESS OF BREATH: 0
VOMITING: 0
DIARRHEA: 0
VOICE CHANGE: 0
RECTAL PAIN: 0
CONSTIPATION: 0
ABDOMINAL PAIN: 0
ABDOMINAL DISTENTION: 0

## 2021-06-23 NOTE — PROGRESS NOTES
Subjective:      Jenna Swenson is a49 y.o. female  Chief Complaint   Patient presents with    Anemia       HPI  PCP: LYLE Leon - CNP  Referring Provider: John Simental MD  Pt is referred back here for chronic anemia. Previous pt of LYLE Crook  New pt to me. Pt was evauated in this office in 2019 for anemia. EGD and colonoscopy were performed at that time, and were unrevealing for etiology of anemia. Duodenal bx negative for celiac sprue. She has continued to have anemia. Failed oral iron, now on injectable iron prn, last dose was earlier this month. Most recent labs 5/2021 note H/H of 10.7/38  Stools OB, 2 out of 3 are positive for blood. She has no GI complaints at all. Specifically denies any evidence of GI blood loss (melena, brbpr, hematemesis). Pt reports she has been anemic \"all my life. \"  S/p natural menopause, last menses 10 years ago. She is referred here for M2A capsule endoscopy. Pt denies any hx of SBO. We discussed potential risk of capsule retention, and if this was to occur the only way to remove it would be surgically. She understands and wishes to proceed. Family HX:    Pt denies family hx of colon polyps, colon CA, inflammatory bowel dx, gastric CA and esophageal CA.     Past Medical History:   Diagnosis Date    Anemia     Arthritis     RA    Fibromyalgia     GERD (gastroesophageal reflux disease)     Hypothyroidism     hypothyroidism    Iron deficiency anemia 1/6/2020    Lupus ([address]     Sjogren's disease ([address]           Past Surgical History:   Procedure Laterality Date    CHOLECYSTECTOMY      COLONOSCOPY N/A 8/27/2019    Dr Marybel Monge to 8/28/19 due to prep    COLONOSCOPY N/A 8/28/2019    Dr Preston Grade hemorrhoids-Grade 1, suboptimal prep, diverticulosis, 5 yr recall    OVARY REMOVAL Right     UPPER GASTROINTESTINAL ENDOSCOPY N/A 8/27/2019    Dr Perez Ket appearing gastric polyps       Social History Socioeconomic History    Marital status:      Spouse name: None    Number of children: None    Years of education: None    Highest education level: None   Occupational History    None   Tobacco Use    Smoking status: Never Smoker    Smokeless tobacco: Never Used   Vaping Use    Vaping Use: Never used   Substance and Sexual Activity    Alcohol use: Never    Drug use: Never    Sexual activity: None   Other Topics Concern    None   Social History Narrative    None     Social Determinants of Health     Financial Resource Strain:     Difficulty of Paying Living Expenses:    Food Insecurity:     Worried About Running Out of Food in the Last Year:     Ran Out of Food in the Last Year:    Transportation Needs:     Lack of Transportation (Medical):  Lack of Transportation (Non-Medical):    Physical Activity:     Days of Exercise per Week:     Minutes of Exercise per Session:    Stress:     Feeling of Stress :    Social Connections:     Frequency of Communication with Friends and Family:     Frequency of Social Gatherings with Friends and Family:     Attends Evangelical Services:     Active Member of Clubs or Organizations:     Attends Club or Organization Meetings:     Marital Status:    Intimate Partner Violence:     Fear of Current or Ex-Partner:     Emotionally Abused:     Physically Abused:     Sexually Abused:         Allergies   Allergen Reactions    Adhesive Tape        Current Outpatient Medications   Medication Sig Dispense Refill    hydroCHLOROthiazide (MICROZIDE) 12.5 MG capsule Take 12.5 mg by mouth every morning       DULoxetine (CYMBALTA) 60 MG extended release capsule Take 60 mg by mouth every morning      Coenzyme Q10 (CO Q10) 100 MG CAPS Take by mouth daily      ondansetron (ZOFRAN) 4 MG tablet Take 1 tablet by mouth every 8 hours as needed for Nausea or Vomiting 3 tablet 0    Ascorbic Acid (VITAMIN C) 1000 MG tablet Take 1,000 mg by mouth daily      calcium acetate-magnesium carb 450-200 MG TABS Take by mouth 2 times daily      omeprazole (PRILOSEC) 20 MG delayed release capsule Take 20 mg by mouth daily      Fe Bisgly-Succ-C-Thre-B12-FA (IRON-150 PO) Take by mouth daily      predniSONE (DELTASONE) 10 MG tablet Take 10 mg by mouth daily      hydroxychloroquine (PLAQUENIL) 200 MG tablet Take by mouth 2 times daily       gabapentin (NEURONTIN) 300 MG capsule Take 300 mg by mouth 3 times daily.  levothyroxine (SYNTHROID) 150 MCG tablet Take 150 mcg by mouth Daily       folic acid (FOLVITE) 1 MG tablet Take 1 mg by mouth daily      DULoxetine (CYMBALTA) 30 MG extended release capsule Take 30 mg by mouth nightly        No current facility-administered medications for this visit. Review of Systems   Constitutional: Positive for fatigue. Negative for appetite change, fever and unexpected weight change. HENT: Negative for sore throat, trouble swallowing and voice change. Respiratory: Negative for cough and shortness of breath. Cardiovascular: Negative for chest pain, palpitations and leg swelling. Gastrointestinal: Negative for abdominal distention, abdominal pain, anal bleeding, blood in stool, constipation, diarrhea, nausea, rectal pain and vomiting. Genitourinary: Negative for hematuria. Musculoskeletal: Positive for arthralgias and back pain. Negative for neck pain. Neurological: Negative for dizziness, weakness, light-headedness and headaches. Psychiatric/Behavioral: Positive for dysphoric mood. Negative for sleep disturbance. The patient is nervous/anxious. All other systems reviewed and are negative. Objective:     Physical Exam  Vitals and nursing note reviewed. Constitutional:       Appearance: She is well-developed. Comments: /70   Pulse 97   Ht 5' 5\" (1.651 m)   Wt 250 lb (113.4 kg)   SpO2 99%   BMI 41.60 kg/m²    Eyes:      General: No scleral icterus.      Conjunctiva/sclera: Conjunctivae normal. Pupils: Pupils are equal, round, and reactive to light. Neck:      Thyroid: No thyromegaly. Cardiovascular:      Rate and Rhythm: Normal rate and regular rhythm. Heart sounds: Normal heart sounds. No murmur heard. No friction rub. No gallop. Pulmonary:      Effort: Pulmonary effort is normal. No respiratory distress. Breath sounds: Normal breath sounds. Abdominal:      General: Bowel sounds are normal. There is no distension. Palpations: Abdomen is soft. Tenderness: There is no abdominal tenderness. There is no rebound. Musculoskeletal:         General: No deformity. Normal range of motion. Cervical back: Normal range of motion and neck supple. Neurological:      Mental Status: She is alert and oriented to person, place, and time. Cranial Nerves: No cranial nerve deficit. Psychiatric:         Judgment: Judgment normal.           Assessment:       Diagnosis Orders   1. Chronic anemia  FL SMALL BOWEL FOLLOW THROUGH ONLY         Plan:      1. SBFT.  If stable and no small bowel narrowing will proceed with M2A.  2. Time spent- 30 min

## 2021-07-01 NOTE — PROGRESS NOTES
MEDICAL HEMATOLOGY/ONCOLOGY PROGRESS NOTE       Keith Choe   1970 7/6/2021     Chief Complaint   Patient presents with    Other     anemia        INTERVAL HISTORY/HISTORY OF PRESENT ILLNESS:    Diagnosis  · Iron deficiency anemia, July 2019  · Negative upper/lower GI scope August 2019    Treatment summary  · Failed p.o. iron replacement/intolerance to p.o. iron replacement  · 2/28/2020 IV iron replacement with Injectafer 750 mg  · 5/28/21 & 6/4/21 IV iron replacement with Injectafer 750 mg x2    Hematology history  The patient has been diagnosed with iron deficiency anemia. She is postmenopausal and therefore underwent a GI work-up. Upper endoscopy and colonoscopy were unremarkable. She did not have a capsule endoscopy yet but this is a scheduled by GI. She had occult blood x3 performed in the stools which was negative at that time. She received Injectafer a total of 1500 mg in February 2020. She was again seen by her primary care provider. She was recently found to be iron deficient again. She also had a flare of SLE and is currently on Plaquenil and steroids. Still she is now scheduled she was extremely fatigued. Iron profile was performed and showed iron saturation 6%. Ferritin was not performed. She denies any melena, hematochezia, hematuria. Hematology history  Keith Choe is a 46 y.o.  female referred by Dellie Denver, APRN for new onset iron deficiency anemia.   · 7/18/19 CBC revealed a WBC of 13.3 with 77% PMN, 14% lymphocytes, 6% monocytes, 2% eosinophils, 1% basophils, Hgb 9.7, MCV 74, MCHC 31.8, ,000. · 7/18/19 CMP was WNL. ESR 5 (0-40)  · 7/25/19 CBC revealed a WBC of 10.6, Hgb 9.3, MCV 77.5, MCHC 30, ,000. · She was started on Niferex-150 daily and omeprazole was increased to twice daily. · 8/20/19 Follow-up CBC revealed a WBC of 9.9, Hgb 9.9, MCV 76.7, MCHC 30, ,000.   PRIOR CBCS  · 1/20/2018, WBC 11.9, Hgb 12.5, MCV 87.9, PLT 325,000  · 1/18/2017, WBC 10.4 with normal percent differential, Hgb 12.6, MCV 89.3, ,000  · 3/28/2017, WBC 10.1, Hgb 13, MCV 90.8, ,000  · 8/27/19 Endoscopy was performed by Dr. Santiago Cheung was negative except for some diminutive gastric polyps with small bowel biopsies negative for sprue. · 8/28/19 Colonoscopy performed by Dr. Santiago Cheung revealed left-sided diverticulosis, grade 1 internal hemorrhoids. · Small bowel evaluation has not been performed yet. · Occult bloody stools x3 negative  · 1/3/2020 laboratory studies showed CBC showed WBC 12.4, hemoglobin 11.5/MCV 80, RDW 18, platelet counts 406,255. Iron profile showed iron 44, TIBC 419, U , iron saturation 11%, ferritin 10. CMP showed normal kidney function creatinine 0.8. LFTs normal.  Otherwise unremarkable. · 1/7/2020poor tolerance to p.o. iron with complains of abdominal pain, nausea constipation. Therefore recommend IV iron replacement with Injectafer 750mg x 2 doses. · 2/18/2020WBC 10.3, hemoglobin 13.2/MCV 86, platelets 135,959. Therefore, improvement of her anemia.   · 6/4/21 Stool occult blood-positive x2,  negative x1  · 5/28/21 & 6/4/21 IV iron replacement with Injectafer 750 mg x2 doses  · 07/03/21-Iron 62, TIBC 229, %sat 27, Ferritin 284, WBC12.9, Hb 12.3/MCV82, Platelets 693D     PAST MEDICAL HISTORY:   Past Medical History:   Diagnosis Date    Anemia     Arthritis     RA    Fibromyalgia     GERD (gastroesophageal reflux disease)     Hypothyroidism     hypothyroidism    Iron deficiency anemia 1/6/2020    [address] ([facility] [address]     Sjogren's disease ([facility] [address]           PAST SURGICAL HISTORY:  Past Surgical History:   Procedure Laterality Date    CHOLECYSTECTOMY      COLONOSCOPY N/A 8/27/2019    Dr Pelon Disla to 8/28/19 due to prep    COLONOSCOPY N/A 8/28/2019    Dr Merritt Cardenas hemorrhoids-Grade 1, suboptimal prep, diverticulosis, 5 yr recall    OVARY REMOVAL Right     UPPER GASTROINTESTINAL ENDOSCOPY N/A 8/27/2019    Dr Levi Hallmark appearing gastric polyps        SOCIAL HISTORY:  Social History     Socioeconomic History    Marital status:      Spouse name: Not on file    Number of children: Not on file    Years of education: Not on file    Highest education level: Not on file   Occupational History    Not on file   Tobacco Use    Smoking status: Never Smoker    Smokeless tobacco: Never Used   Vaping Use    Vaping Use: Never used   Substance and Sexual Activity    Alcohol use: Never    Drug use: Never    Sexual activity: Not on file   Other Topics Concern    Not on file   Social History Narrative    Not on file     Social Determinants of Health     Financial Resource Strain:     Difficulty of Paying Living Expenses:    Food Insecurity:     Worried About Running Out of Food in the Last Year:     [address] in the Last Year:    Transportation Needs:     Lack of Transportation (Medical):      Lack of Transportation (Non-Medical):    Physical Activity:     Days of Exercise per Week:     Minutes of Exercise per Session:    Stress:     Feeling of Stress :    Social Connections:     Frequency of Communication with Friends and Family:     Frequency of Social Gatherings with Friends and Family:     Attends Yazdanism Services:     Active Member of Clubs or Organizations:     Attends Club or Organization Meetings:     Marital Status:    Intimate Partner Violence:     Fear of Current or Ex-Partner:     Emotionally Abused:     Physically Abused:     Sexually Abused:        FAMILY HISTORY:  Family History   Problem Relation Age of Onset    Colon Cancer Neg Hx     Colon Polyps Neg Hx     Esophageal Cancer Neg Hx     Liver Cancer Neg Hx     Liver Disease Neg Hx     Rectal Cancer Neg Hx     Stomach Cancer Neg Hx         Current Outpatient Medications   Medication Sig Dispense Refill    hydroCHLOROthiazide (MICROZIDE) 12.5 MG capsule Take 12.5 mg by mouth every

## 2021-07-06 ENCOUNTER — OFFICE VISIT (OUTPATIENT)
Dept: HEMATOLOGY | Age: 51
End: 2021-07-06
Payer: COMMERCIAL

## 2021-07-06 VITALS
WEIGHT: 245 LBS | BODY MASS INDEX: 40.77 KG/M2 | DIASTOLIC BLOOD PRESSURE: 90 MMHG | RESPIRATION RATE: 20 BRPM | HEART RATE: 104 BPM | SYSTOLIC BLOOD PRESSURE: 162 MMHG | OXYGEN SATURATION: 91 % | TEMPERATURE: 98.5 F

## 2021-07-06 DIAGNOSIS — M35.9 AUTOIMMUNE DISEASE (HCC): ICD-10-CM

## 2021-07-06 DIAGNOSIS — D50.8 OTHER IRON DEFICIENCY ANEMIA: Primary | ICD-10-CM

## 2021-07-06 DIAGNOSIS — M32.9 LUPUS (HCC): ICD-10-CM

## 2021-07-06 PROCEDURE — 99213 OFFICE O/P EST LOW 20 MIN: CPT | Performed by: INTERNAL MEDICINE

## 2021-07-28 ENCOUNTER — HOSPITAL ENCOUNTER (OUTPATIENT)
Dept: GENERAL RADIOLOGY | Age: 51
Discharge: HOME | End: 2021-07-28
Payer: COMMERCIAL

## 2021-07-28 ENCOUNTER — TELEPHONE (OUTPATIENT)
Dept: GASTROENTEROLOGY | Age: 51
End: 2021-07-28

## 2021-07-28 DIAGNOSIS — D64.9 CHRONIC ANEMIA: ICD-10-CM

## 2021-07-28 PROCEDURE — 74250 X-RAY XM SM INT 1CNTRST STD: CPT

## 2021-07-28 NOTE — TELEPHONE ENCOUNTER
7/28/21    PT HAS BEEN ADVISED OF HER RESULTS AND ALANA'S RECOMMENDATIONS. PT IS AWARE THAT THE M2A HAS TO BE APPROVED BY INSURANCE FIRST. THEN SHE WILL GET A CALL TO SCHEDULE. PT VERBALIZED UNDERSTANDING. SHILPA/LUCIA CAN YOU GET PT SCHEDULED, PLEASE. THANKS.

## 2021-07-28 NOTE — TELEPHONE ENCOUNTER
Please let pt know I have reviewed the results of small bowel xray. Fine to proceed with M2A. Please get this scheduled for pt. Wanted to let her know that it noted a moderate to large amount of stool in her colon. If she feels like she has constipation she should start miralax 1 capful in 8 ounces of liquid daily.

## 2021-08-18 ENCOUNTER — TELEPHONE (OUTPATIENT)
Dept: GASTROENTEROLOGY | Age: 51
End: 2021-08-18

## 2021-08-18 NOTE — TELEPHONE ENCOUNTER
Renetta requests that Sonu  return their call. The best time to reach her is Anytime. The pt is calling to schedule a procedure. Thank you.

## 2021-09-13 ENCOUNTER — NURSE ONLY (OUTPATIENT)
Dept: GASTROENTEROLOGY | Age: 51
End: 2021-09-13

## 2021-09-13 DIAGNOSIS — D50.9 IRON DEFICIENCY ANEMIA, UNSPECIFIED IRON DEFICIENCY ANEMIA TYPE: Primary | ICD-10-CM

## 2021-09-13 NOTE — PROGRESS NOTES
Patient presented to the office, as scheduled, for M2A pill cam ingestion and equipment placement. Instructions, risks, and benefits were discussed. All questions were answered and patient acknowledged understanding. Consent was signed and dated. The sensor belt was then placed around the mid abdomen and attached the recorder device to it and placed in shoulder holster. The pill cam was then opened and activated (light on recorder blinking blue). The patient successfully swallowed the pill cam with a small amount of water mixed with simethicone drops. When patient returns, the recorder will be uploaded to the computer and the appointed physician will be notified for the report to be read and resulted.      Instructions are as follows:   Clear liquids 2 hours after ingestion of pill cam  Light snack 4 hours after ingestion of pill cam  Avoid any MRI machines  Resume ADL's as normal throughout the day  Watch for Pill cam to be expelled, if not seen in stool further testing may be required   Return to office no later than 4:15 pm  Call the office if the blue light stops blinking or all lights disappear [phone]    Capsule ID [id number], Lot [id number], Exo 12/15/22

## 2021-09-17 ENCOUNTER — TELEPHONE (OUTPATIENT)
Dept: GASTROENTEROLOGY | Age: 51
End: 2021-09-17

## 2021-09-17 DIAGNOSIS — D64.9 CHRONIC ANEMIA: Primary | ICD-10-CM

## 2021-09-17 NOTE — TELEPHONE ENCOUNTER
Assessment:        Diagnosis Orders   1. Chronic anemia  [place] SMALL BOWEL FOLLOW THROUGH ONLY                    Plan:      1. SBFT. If stable and no small bowel narrowing will proceed with M2A.  2. Time spent- 30 min        Last visit [facility] aprn 6-23-21. No FU scheduled. M2A was done 9-13-21 and results are pending. SBFT was completed 7-28-21. CLN DR. Yulisa Monge 8-28-19. EGD DR. Yulsia Monge 8-27-21. Patient called today from [phone] said she knows not related but since she had the [address] on 9-13-21 she is more nauseated, lots of increased gas and belching, states she is currently on Omeprazole 20 mg @ HS and is currently using Zofran for nausea but neither seem to be helping at present. I told the patient to change taking her PPI to the AM with a tiny sip of water and then wait at least 30 minutes before she eats or drinks anything else, I also suggested she alter her diet to soft and bland foods for a few days and see if either one of the suggestions help at all. Patient said she hopes to get her M2A results pretty soon from [address] [facility] [address] and I told her he was pretty quick about getting those read. I asked the patient if she saw the pill cam pass in her stool and she said no. [facility] aprn is out of the office today and as the patient requested for me to please send this message to [facility] to review when she returns and just see if she has any further recommendations as well. Patient did not feel any of this was emergent.   lui

## 2021-09-20 DIAGNOSIS — D50.8 OTHER IRON DEFICIENCY ANEMIA: Primary | ICD-10-CM

## 2021-09-20 DIAGNOSIS — D64.9 CHRONIC ANEMIA: ICD-10-CM

## 2021-09-20 NOTE — PROGRESS NOTES
09-20-21 Per Dr Ciarra Kim:  1. Normal Duodenal, Jejunal and Ileal mucosa. NO obvious ulcers or erosions or masses or strictures or stenosis  or AVMs or evidence of active or recent bleeding were detected on this study. RECOMMEND:  1. Continue current meds including parenteral Iron infusions periodically  2. Will obtain a KUB to confirm successful passage of the Pill Cam out of the patient's GI tract. 3. Monitor CBC periodically  4. GI clinic f/u PRN; continue f/u with her other MDs including PCP and Heme specialist.      Called and notified patient of results and recommendations as per Dr Bryant Shore. Verbal agreement per patient.        Routed results to PCP Santo Landau

## 2021-09-21 NOTE — TELEPHONE ENCOUNTER
Results of M2A under notes. M2A was normal.  She needs to f/u with PCP and hematology for her anemia. Get a KUB since Dr Smith Height didn't see the capsule pass into her colon. Order for this placed. She had no Gi complaints when I saw her in clinic 6/2021. If she continues to have nausea she needs an appt. Thanks!

## 2021-09-21 NOTE — TELEPHONE ENCOUNTER
9-21-21  I tried to call this patient, she did not answer and her VM has not been set up as of 8:07 am. Clemente poe

## 2021-09-21 NOTE — TELEPHONE ENCOUNTER
9-21-21  I spoke to this patient, she said her nausea has passed and her BM's are now normal so she does not feel an OV is necessary at present. Patient did say she will go on Wednesday to get the KUB just to confirm that the Pill Cam had passed, she feels certain it has but for any future testing such as MRI it needs to be confirmed that the Pill Cam has indeed passed and the patient agrees.  Clemente poe

## 2021-09-28 NOTE — TELEPHONE ENCOUNTER
09-28-21 Called to remind that she is needing the xray done as per [facility]     She stated that next week is fall break and she will try to get it done then.  Cv

## 2021-10-06 ENCOUNTER — TELEPHONE (OUTPATIENT)
Dept: GASTROENTEROLOGY | Age: 51
End: 2021-10-06

## 2021-10-06 ENCOUNTER — HOSPITAL ENCOUNTER (OUTPATIENT)
Dept: GENERAL RADIOLOGY | Age: 51
Discharge: HOME | End: 2021-10-06
Payer: COMMERCIAL

## 2021-10-06 DIAGNOSIS — D64.9 CHRONIC ANEMIA: ICD-10-CM

## 2021-10-06 PROCEDURE — 74018 RADEX ABDOMEN 1 VIEW: CPT

## 2021-10-06 NOTE — TELEPHONE ENCOUNTER
Please let Abimbola Dong know the small bowel xray was unrevealing other than constipation. M2A capsule is not seen in her colon. For her constipation I would recommend taking a bottle of magnesium citrate today and again tomorrow and then starting on miralax 1 capful mixed in 8 ounces of liquid and drink daily.

## 2021-10-07 NOTE — TELEPHONE ENCOUNTER
10-07-21 Called spoke with the patient notified of results and recommendations as per [facility] APRN.  Yo

## 2021-11-08 NOTE — PROGRESS NOTES
small bowel biopsies negative for sprue. · 8/27/2019 upper endoscopy showed Esophagus: normal; EGJ at 40 cm and normal.There is NO hiatal hernia present. Stomach:  Normal except for a few 3-5 mm sessile hyperplastic appearing polyps in the body likely from her use of acid suppression medications. NO overt atrophic gastritis. Rugae were normal. Lumen distended well with insufflation. Duodenum: normal; random biopsies were taken to check for Celiac disease/villous atrophy  · 8/28/19 Colonoscopy performed by Dr. Twin Li revealed left-sided diverticulosis, grade 1 internal hemorrhoids. · Small bowel evaluation has not been performed yet. · Occult bloody stools x3 negative  · 1/3/2020 laboratory studies showed CBC showed WBC 12.4, hemoglobin 11.5/MCV 80, RDW 18, platelet counts 782,363. Iron profile showed iron 44, TIBC 419, U , iron saturation 11%, ferritin 10. CMP showed normal kidney function creatinine 0.8. LFTs normal.  Otherwise unremarkable. · 1/7/2020poor tolerance to p.o. iron with complains of abdominal pain, nausea constipation. Therefore recommend IV iron replacement with Injectafer 750mg x 2 doses. · 2/18/2020WBC 10.3, hemoglobin 13.2/MCV 86, platelets 242,189. Therefore, improvement of her anemia. · 6/4/21 Stool occult blood-positive x2,  negative x1  · 5/28/21 & 6/4/21 IV iron replacement with Injectafer 750 mg x2 doses  · 07/03/21-Iron 62, TIBC 229, %sat 27, Ferritin 284, WBC12.9, Hb 12.3/MCV82, Platelets 147U   · 0/06/4650- Endoscope Capsule- Normal Duodenal,Jejunal and Ileal mucosa. No obvious ulcers or erosion or masses or strictures or strenuous or AVMs or evidence of active or recent bleeding were detected on this study. · 10/6/2021 XR Abdomen(single) Large amount of stool in colon. No evidence of obstruction or ileus. · 11/09/21-CBC 13.1, Hb 13.1, platelets 971Q.      PAST MEDICAL HISTORY:   Past Medical History:   Diagnosis Date    Anemia     Arthritis     RA    Fibromyalgia     GERD (gastroesophageal reflux disease)     Hypothyroidism     hypothyroidism    Iron deficiency anemia 1/6/2020    Lup[facility] [address]     Sjogren's disease ([facility] [address]           PAST SURGICAL HISTORY:  Past Surgical History:   Procedure Laterality Date    CHOLECYSTECTOMY      COLONOSCOPY N/A 8/27/2019     Marta to 8/28/19 due to prep    COLONOSCOPY N/A 8/28/2019    Dr Trupti Castro hemorrhoids-Grade 1, suboptimal prep, diverticulosis, 5 yr recall    OVARY REMOVAL Right     UPPER GASTROINTESTINAL ENDOSCOPY N/A 8/27/2019    Dr Renato Jha appearing gastric polyps        SOCIAL HISTORY:  Social History     Socioeconomic History    Marital status:      Spouse name: None    Number of children: None    Years of education: None    Highest education level: None   Occupational History    None   Tobacco Use    Smoking status: Never Smoker    Smokeless tobacco: Never Used   Vaping Use    Vaping Use: Never used   Substance and Sexual Activity    Alcohol use: Never    Drug use: Never    Sexual activity: None   Other Topics Concern    None   Social History Narrative    None     Social Determinants of Health     Financial Resource Strain:     Difficulty of Paying Living Expenses: Not on file   Food Insecurity:     Worried About Running Out of Food in the Last Year: Not on file    Yennifer of Food in the Last Year: Not on file   Transportation Needs:     Lack of Transportation (Medical): Not on file    Lack of Transportation (Non-Medical):  Not on file   Physical Activity:     Days of Exercise per Week: Not on file    Minutes of Exercise per Session: Not on file   Stress:     Feeling of Stress : Not on file   Social Connections:     Frequency of Communication with Friends and Family: Not on file    Frequency of Social Gatherings with Friends and Family: Not on file    Attends Voodoo Services: Not on file    Active Member of Clubs or Organizations: Not on commands, non focal   PSYCH: mood and affect appropriate. Alert and oriented to time, place, person    Relevant Lab findings/reviewed by me:  11/9/2021 CBC  WBC 11.2  HGB 13.1    Neut 71    Relevant Imaging studies/reviewed by me:  9/20/2021 Endoscope Capsule Normal Duodenal,Jejunal and Ileal mucosa. No obvious ulcers or erosion or masses or strictures or strenuous or AVMs or evidence of active or recent bleeding were detected on this study. 10/6/2021 XR Abdomen(single) Large amount of stool in colon. No evidence of obstruction or ileus. ASSESSMENT:    Orders Placed This Encounter   Procedures    Iron and TIBC     Standing Status:   Future     Standing Expiration Date:   5/9/2023     Order Specific Question:   Is Patient Fasting? Answer:   no     Order Specific Question:   No of Hours? Answer:   0    Ferritin     Standing Status:   Future     Standing Expiration Date:   5/9/2023    Comprehensive Metabolic Panel     Standing Status:   Future     Standing Expiration Date:   5/9/2023    CBC Auto Differential     Standing Status:   Future     Standing Expiration Date:   5/9/2023      Kadeem Blum was seen today for follow-up. Diagnoses and all orders for this visit:    Other iron deficiency anemia  -     Iron and TIBC; Future  -     Ferritin; Future  -     Comprehensive Metabolic Panel; Future  -     CBC Auto Differential; Future    Care plan discussed with patient    Autoimmune disease ([facility] [address]    Lupus ([facility] [address]    Anemia, unspecified type         #Iron deficiency anemia iron profile consistent with iron deficiency anemia. Upper and lower endoscopy was unrevealing. She did  have a capsule endoscopy performed that was also unremarkable. Occult blood x3 positive in 2020. Patient received IV iron therapy last year. She had very poor tolerance to PO iron replacement. She received IV iron replacement with Injectafer 750 mg x 2 dose February 2020.   Iron profile now with severe iron deficiency. · 5/28/21 & 6/4/21 IV iron replacement with Injectafer 750 mg x2 doses  · 07/03/21-Iron 62, TIBC 229, %sat 27, Ferritin 284, WBC12.9, Hb 12.3/MCV82, Platelets 039V     Unremarkable capsule endoscopy:. NormalDuodenal,JejunalandIlealmucosa. NOobviousulcersorerosionsormassesorstricturesorstenosis  or AVMsorevidenceofactiveorrecentbleedingweredetectedonthisstudy. CBC today showed normal hemoglobin 13.3. Continue follow-up. #At risk for sleep apnea she is to follow-up with her PCP for further work-up of these. #Hypertension140/88 mmhg. COVID-19patient has not received COVID 19 vaccination yet. She was strongly recommended to proceed with vaccination. Plan:  · Follow-up PCP   · CBC, CMP, iron studies, ferritin prior to 1 week prior to next visit  · RTC with MD 6 months in [place] office or sooner if needed    Follow Up:      Return in about 6 months (around 5/9/2022) for Appointment with Dr. See Hearn in [place] . Labs 1 week prior to next visit in 6 months      I, Wilda Rodriguez, am scribing for Donnie Bruce MD. Electronically signed by Wilda Rodriguez RN on 11/9/2021 at 11:52 AM CST. I, Dr Shey Pandey, personally performed the services described in this documentation as scribed by Wilda Rodriguez RN in my presence and is both accurate and complete. I have seen, examined and reviewed this patient medication list, appropriate labs and imaging studies. I reviewed relevant medical records and others physicians notes. I discussed the plans of care with the patient. I answered all the questions to the patients satisfaction. I have also reviewed the chief complaint (CC) and part of the history (History of Present Illness (HPI), Past Family Social History [facility]), or Review of Systems (ROS) and made changes when appropriated.        (Please note that portions of this note were completed with a voice recognition program. Efforts were made to edit the dictations but occasionally words are mis-transcribed.)    Electronically signed by Michael Blandon MD on 11/9/2021 at 11:53 AM

## 2021-11-09 ENCOUNTER — OFFICE VISIT (OUTPATIENT)
Dept: HEMATOLOGY | Age: 51
End: 2021-11-09
Payer: COMMERCIAL

## 2021-11-09 VITALS
HEART RATE: 95 BPM | SYSTOLIC BLOOD PRESSURE: 140 MMHG | BODY MASS INDEX: 41.69 KG/M2 | WEIGHT: 250.2 LBS | DIASTOLIC BLOOD PRESSURE: 88 MMHG | TEMPERATURE: 98.2 F | HEIGHT: 65 IN | OXYGEN SATURATION: 97 %

## 2021-11-09 DIAGNOSIS — D64.9 ANEMIA, UNSPECIFIED TYPE: ICD-10-CM

## 2021-11-09 DIAGNOSIS — D50.8 OTHER IRON DEFICIENCY ANEMIA: Primary | ICD-10-CM

## 2021-11-09 DIAGNOSIS — M32.9 LUPUS (HCC): ICD-10-CM

## 2021-11-09 DIAGNOSIS — M35.9 AUTOIMMUNE DISEASE (HCC): ICD-10-CM

## 2021-11-09 DIAGNOSIS — Z71.89 CARE PLAN DISCUSSED WITH PATIENT: ICD-10-CM

## 2021-11-09 PROCEDURE — 99213 OFFICE O/P EST LOW 20 MIN: CPT | Performed by: INTERNAL MEDICINE

## 2021-12-29 ENCOUNTER — TELEPHONE (OUTPATIENT)
Dept: GASTROENTEROLOGY | Age: 51
End: 2021-12-29

## 2021-12-29 NOTE — TELEPHONE ENCOUNTER
12-29-21    Assessment:        Diagnosis Orders   1. Chronic anemia  [place] SMALL BOWEL FOLLOW THROUGH ONLY                    Plan:      1. SBFT. If stable and no small bowel narrowing will proceed with M2A.  2. Time spent- 30 min      Last visit [facility] aprn 6-23-21, see above. FU is now scheduled 1-21-22. CLN  8-28-19. Egd 8-27-19. SBFT completed 7-28-21. M2A Completed 9-13-21 read by Stevo Reardon in [address] [place] [address] RdEfraín QUINN done 10-6-21. Patient called today from [phone] requesting an appointment with [facility] aprn to discuss increased N&V and diarrhea. I told the patient Airam Muniz is out of the office till next Tuesday, she voiced understanding and we have her scheduled to see [facility] aprn on 1-21-22. Patient said the N&V and diarrhea has been going on since before Thanksgiving and she is exhausted from this and wants to discuss. Patient did say she will be seeing her PCP next week and will discuss with him as well. Patient knows if she gets worse and feels dehydrated to report to ED for evaluation. Patient said she is going to try OTC Imodium to see if this will slow the diarrhea down is asking if [facility] aprn will give her Rx for Zofran if her PCP won't. I will forward to [facility] ap[facility] to review when she returns to clinic.  Clemente poe

## 2021-12-29 NOTE — TELEPHONE ENCOUNTER
Renetta requests a call back please. She advised that she has had diarrhea and vomiting since before Thanksgiving and it is not getting any better, she feels exhausted from it all. She is unable to eat much, soon as she does, she said the diarrhea starts first and then she is vomiting. No family members are sick or have had any stomach bugs. Thank you.

## 2021-12-30 NOTE — TELEPHONE ENCOUNTER
Lacie Myles spoke to the patient today  @ 2:27 pm and she said she is some better with the diarrhea today, said the Imodium has helped but still has a lot of nausea and is asking if you will send in Rx for Zofran that she can use over the weekend. Patient uses [facility] ( in her profile). Patient said she will be trying to get an appointment with her PCP for next week and if he does not order stool testing and if the diarrhea returns she will come by here and get a [employer] Stool Kit. I called [facility] aprn and she told me she is not at home and to call the Rx to the patient's Pharmacy for Zofran 4 mg Tid prn # 45 x 0 refills which I did. Patient knows to check with her Pharmacy later today.  Clemente poe

## 2021-12-30 NOTE — TELEPHONE ENCOUNTER
12-30-21  @ 12:46 pm    I tried to call this patient, she did not answer, I left her a VM @ 12:34 PM asking for a return call.  Clemente poe

## 2021-12-30 NOTE — TELEPHONE ENCOUNTER
Yes, when I saw her in June 2021 she had no GI complaints so these complaints are new for her. definitely recommend seeing PCP. If her pcp didn't do stool testing she needs this done. Have her come  a diatherix stool kit if this is the case. She can let me know if her PCP wont prescribe zofran.  thanks

## 2022-05-06 ENCOUNTER — TELEPHONE (OUTPATIENT)
Dept: HEMATOLOGY | Age: 52
End: 2022-05-06

## 2022-05-09 NOTE — PROGRESS NOTES
MEDICAL HEMATOLOGY/ONCOLOGY PROGRESS NOTE       David Grayson   1970  5/10/2022     Chief Complaint   Patient presents with    Follow-up     Other iron deficiency anemia        INTERVAL HISTORY/HISTORY OF PRESENT ILLNESS:    Diagnosis  · Iron deficiency anemia, July 2019  · Negative upper/lower GI scope August 2019    Treatment summary  · Failed p.o. iron replacement/intolerance to p.o. iron replacement  · 2/28/2020 IV iron replacement with Injectafer 750 mg  · 5/28/21 & 6/4/21 IV iron replacement with Injectafer 750 mg x2    Hematology history  The patient has been diagnosed with iron deficiency anemia. She presents today for a routine follow-up visit. She received iron therapy in June 2021. She has felt quite good. Denies any new complaints. She also has a diagnosis of systemic lupus erythematosus. She takes Plaquenil and low-dose of prednisone. She is followed by rheumatology in [place]. She denies any overt hematuria, hematochezia. She does take PPIs for GERD symptoms. She had labs done today. She has feeling overall weak. Hematology history  David Grayson is a 46 y.o.  female referred by LYLE Reddy for new onset iron deficiency anemia.   · 7/18/19 CBC revealed a WBC of 13.3 with 77% PMN, 14% lymphocytes, 6% monocytes, 2% eosinophils, 1% basophils, Hgb 9.7, MCV 74, MCHC 31.8, ,000. · 7/18/19 CMP was WNL. ESR 5 (0-40)  · 7/25/19 CBC revealed a WBC of 10.6, Hgb 9.3, MCV 77.5, MCHC 30, ,000. · She was started on Niferex-150 daily and omeprazole was increased to twice daily. · 8/20/19 Follow-up CBC revealed a WBC of 9.9, Hgb 9.9, MCV 76.7, MCHC 30, ,000.   PRIOR CBCS  · 1/20/2018, WBC 11.9, Hgb 12.5, MCV 87.9, ,000  · 1/18/2017, WBC 10.4 with normal percent differential, Hgb 12.6, MCV 89.3, ,000  · 3/28/2017, WBC 10.1, Hgb 13, MCV 90.8, ,000  · 8/27/19 Endoscopy was performed by Dr. Lexus Shen was negative except for some diminutive gastric polyps with small bowel biopsies negative for sprue. · 8/27/2019 upper endoscopy showed Esophagus: normal; EGJ at 40 cm and normal.There is NO hiatal hernia present. Stomach:  Normal except for a few 3-5 mm sessile hyperplastic appearing polyps in the body likely from her use of acid suppression medications. NO overt atrophic gastritis. Rugae were normal. Lumen distended well with insufflation. Duodenum: normal; random biopsies were taken to check for Celiac disease/villous atrophy  · 8/28/19 Colonoscopy performed by Dr. Niles Tyler revealed left-sided diverticulosis, grade 1 internal hemorrhoids. · Small bowel evaluation has not been performed yet. · Occult bloody stools x3 negative  · 1/3/2020 laboratory studies showed CBC showed WBC 12.4, hemoglobin 11.5/MCV 80, RDW 18, platelet counts 135,606. Iron profile showed iron 44, TIBC 419, U , iron saturation 11%, ferritin 10. CMP showed normal kidney function creatinine 0.8. LFTs normal.  Otherwise unremarkable. · 1/7/2020poor tolerance to p.o. iron with complains of abdominal pain, nausea constipation. Therefore recommend IV iron replacement with Injectafer 750mg x 2 doses. · 2/18/2020WBC 10.3, hemoglobin 13.2/MCV 86, platelets 313,982. Therefore, improvement of her anemia. · 6/4/21 Stool occult blood-positive x2,  negative x1  · 5/28/21 & 6/4/21 IV iron replacement with Injectafer 750 mg x2 doses  · 07/03/21-Iron 62, TIBC 229, %sat 27, Ferritin 284, WBC12.9, Hb 12.3/MCV82, Platelets 326L   · 1/02/1598- Endoscope Capsule- Normal Duodenal,Jejunal and Ileal mucosa. No obvious ulcers or erosion or masses or strictures or strenuous or AVMs or evidence of active or recent bleeding were detected on this study. · 10/6/2021 XR Abdomen(single) Large amount of stool in colon. No evidence of obstruction or ileus. · 11/09/21-CBC 13.1, Hb 13.1, platelets 533N.    · 05/04/22-WBC 12.4, Hb 10.9/85, Platelets 984P  · 7/8/8975-[facility] Studies [facility]) Iron 33, TIBC 351, Iron Sat 9, Ferritin 13. Poor oral iron tolerance. PAST MEDICAL HISTORY:   Past Medical History:   Diagnosis Date    Anemia     Arthritis     RA    Fibromyalgia     GERD (gastroesophageal reflux disease)     Hypothyroidism     hypothyroidism    Iron deficiency anemia 1/6/2020    Lupus ([facility] [address]     Sjogren's disease ([facility] [address]           PAST SURGICAL HISTORY:  Past Surgical History:   Procedure Laterality Date    CHOLECYSTECTOMY      COLONOSCOPY N/A 8/27/2019    Dr Gage Cleary to 8/28/19 due to prep    COLONOSCOPY N/A 8/28/2019    Dr Ruddy Pablo hemorrhoids-Grade 1, suboptimal prep, diverticulosis, 5 yr recall    OVARY REMOVAL Right     UPPER GASTROINTESTINAL ENDOSCOPY N/A 8/27/2019    Dr Maribell Arreola appearing gastric polyps        SOCIAL HISTORY:  Social History     Socioeconomic History    Marital status:      Spouse name: None    Number of children: None    Years of education: None    Highest education level: None   Occupational History    None   Tobacco Use    Smoking status: Never Smoker    Smokeless tobacco: Never Used   Vaping Use    Vaping Use: Never used   Substance and Sexual Activity    Alcohol use: Never    Drug use: Never    Sexual activity: None   Other Topics Concern    None   Social History Narrative    None     Social Determinants of Health     Financial Resource Strain:     Difficulty of Paying Living Expenses: Not on file   Food Insecurity:     Worried About Running Out of Food in the Last Year: Not on file    Yennifer of Food in the Last Year: Not on file   Transportation Needs:     Lack of Transportation (Medical): Not on file    Lack of Transportation (Non-Medical):  Not on file   Physical Activity:     Days of Exercise per Week: Not on file    Minutes of Exercise per Session: Not on file   Stress:     Feeling of Stress : Not on file   Social Connections:     Frequency of Communication with Friends and Family: Not on file    Frequency of Social Gatherings with Friends and Family: Not on file    Attends Jehovah's witness Services: Not on file    Active Member of Clubs or Organizations: Not on file    Attends Club or Organization Meetings: Not on file    Marital Status: Not on file   Intimate Partner Violence:     Fear of Current or Ex-Partner: Not on file    Emotionally Abused: Not on file    Physically Abused: Not on file    Sexually Abused: Not on file   Housing Stability:     Unable to Pay for Housing in the Last Year: Not on file    Number of [place] in the Last Year: Not on file    Unstable Housing in the Last Year: Not on file       FAMILY HISTORY:  Family History   Problem Relation Age of Onset    Colon Cancer Neg Hx     Colon Polyps Neg Hx     Esophageal Cancer Neg Hx     Liver Cancer Neg Hx     Liver Disease Neg Hx     Rectal Cancer Neg Hx     Stomach Cancer Neg Hx         Current Outpatient Medications   Medication Sig Dispense Refill    hydroCHLOROthiazide (MICROZIDE) 12.5 MG capsule Take 12.5 mg by mouth every morning       DULoxetine (CYMBALTA) 60 MG extended release capsule Take 60 mg by mouth every morning      Coenzyme Q10 (CO Q10) 100 MG CAPS Take by mouth daily      ondansetron (ZOFRAN) 4 MG tablet Take 1 tablet by mouth every 8 hours as needed for Nausea or Vomiting 3 tablet 0    Ascorbic Acid (VITAMIN C) 1000 MG tablet Take 1,000 mg by mouth daily      calcium acetate-magnesium carb 450-200 MG TABS Take by mouth 2 times daily      omeprazole (PRILOSEC) 20 MG delayed release capsule Take 20 mg by mouth daily      predniSONE (DELTASONE) 10 MG tablet Take 10 mg by mouth daily      hydroxychloroquine (PLAQUENIL) 200 MG tablet Take by mouth 2 times daily       gabapentin (NEURONTIN) 300 MG capsule Take 300 mg by mouth 3 times daily.       levothyroxine (SYNTHROID) 150 MCG tablet Take 150 mcg by mouth Daily       folic acid (FOLVITE) 1 MG tablet Take 1 mg by mouth daily      DULoxetine (CYMBALTA) 30 MG extended release capsule Take 30 mg by mouth nightly       Fe Bisgly-Succ-C-Thre-B12-FA (IRON-150 PO) Take by mouth daily (Patient not taking: Reported on 7/6/2021)       No current facility-administered medications for this visit. REVIEW OF SYSTEMS:   CONSTITUTIONAL: no fever, no night sweats, weakness,fatigue;  HEENT: no blurring of vision, no double vision, no hearing difficulty, no tinnitus, no ulceration, no dysplasia, no epistaxis;   LUNGS: no cough, no hemoptysis, no wheeze, shortness of breath;  CARDIOVASCULAR: no palpitation, no chest pain, shortness of breath;  GI: no abdominal pain, no nausea, no vomiting, no diarrhea, no constipation;  JEREMY: no dysuria, no hematuria, no frequency or urgency, no nephrolithiasis;  MUSCULOSKELETAL:back pain, joint pain, joint swelling, no stiffness;  ENDOCRINE: no polyuria, no polydipsia, no cold or heat intolerance;  HEMATOLOGY: no easy bruising or bleeding, no history of clotting disorder;  DERMATOLOGY: no skin rash, no eczema, no pruritus;  PSYCHIATRY: no depression, no anxiety, no panic attacks, no suicidal ideation, no homicidal ideation;  NEUROLOGY: no syncope, no seizures, numbness or tingling of hands, numbness or tingling of feet, no paresis;       Vitals signs:  BP (!) 140/82   Pulse 98   Ht 5' 6\" (1.676 m)   Wt 243 lb (110.2 kg)   SpO2 99%   BMI 39.22 kg/m²    Pain scale:  Pain Score:   0 - No pain   PHYSICAL EXAM:  CONSTITUTIONAL: Alert, appropriate, no acute distress  EYES: Non icteric, EOM intact, pupils equal round   ENT: Mucus membranes moist, no oral pharyngeal lesions, external inspection of ears and nose are normal.  NECK: Supple, no masses. No palpable thyroid mass  CHEST/LUNGS: CTA bilaterally, normal respiratory effort   CARDIOVASCULAR: RRR, no murmurs. No lower extremity edema  ABDOMEN: soft non-tender, active bowel sounds, no HSM.   No palpable masses  EXTREMITIES: warm, full ROM in all 4 extremities, no focal weakness. SKIN: warm, dry with no rashes or lesions  LYMPH: No cervical, clavicular, axillary, or inguinal lymphadenopathy  NEUROLOGIC: follows commands, non focal   PSYCH: mood and affect appropriate. Alert and oriented to time, place, person    Relevant Lab findings/reviewed by me:  5/5/2022 CBC([facility])    5/5/2022 CMP([facility])      5/5/2022 Iron Studies [facility])  Iron 33, TIBC 351, Iron Sat 9, Ferritin 13    Relevant Imaging studies/reviewed by me:      ASSESSMENT:    No orders of the defined types were placed in this encounter. Gumaro was seen today for follow-up. Diagnoses and all orders for this visit:    Other iron deficiency anemia    Care plan discussed with patient    SLE (systemic lupus erythematosus related syndrome) ([address]         #Iron deficiency anemia iron profile consistent with iron deficiency anemia. Upper and lower endoscopy was unrevealing. She did  have a capsule endoscopy performed that was also unremarkable. Occult blood x3 positive in 2020. Patient received IV iron therapy last year. She had very poor tolerance to PO iron replacement. She received IV iron replacement with Injectafer 750 mg x 2 dose February 2020. Iron profile now with severe iron deficiency. · 5/28/21 & 6/4/21 IV iron replacement with Injectafer 750 mg x2 doses  · 07/03/21-Iron 62, TIBC 229, %sat 27, Ferritin 284, WBC12.9, Hb 12.3/MCV82, Platelets 156O   Unremarkable capsule endoscopy:. NormalDuodenal,JejunalandIlealmucosa. NO obvious ulcers or erosions or masses or strictures or stenosis  or AVMs or evidence of active or recent bleeding were detected on this study. CBC today showed normal hemoglobin 10.9, Ferritin 13, iron sat 9%  -05/10/22- Recommended Venofer 500mg IV x 2 doses    #HypertensionBP (!) 140/82   Pulse 98   Ht 5' 6\" (1.676 m)   Wt 243 lb (110.2 kg)   SpO2 99%   BMI 39.22 kg/m²        COVID-19patient has not received COVID 19 vaccination yet.     Plan:  · Follow-up PCP   · CBC, CMP, iron studies, ferritin prior to 1 week prior to next visit  · RTC with MD 6 months in [facility] office or sooner if needed  · IV venofer 500mg IV x 2 doses    Follow Up:      Return for CBC, Appointment with Dr. Barbara Jeffries in [facility]. Venofer 500mg x2 doses at [facility] ins approves     Elmer SIMMONS am pre charting  as Medical Assistant for Aleksander Brantley MD. Electronically signed by Elmer Mahan MA on 5/10/2022 at 9:56 AM CDT. Daniela Dooley am scribing for Aleksander Brantley MD. Electronically signed by Reji Ruiz RN on 5/10/2022 at 11:24 AM CDT. I, Dr Lexi Reynolds, personally performed the services described in this documentation as scribed by Reji Ruiz, RN in my presence and is both accurate and complete. I have seen, examined and reviewed this patient medication list, appropriate labs and imaging studies. I reviewed relevant medical records and others physicians notes. I discussed the plans of care with the patient. I answered all the questions to the patients satisfaction. I have also reviewed the chief complaint (CC) and part of the history (History of Present Illness (HPI), Past Family Social History [facility]), or Review of Systems (ROS) and made changes when appropriated. (Please note that portions of this note were completed with a voice recognition program. Efforts were made to edit the dictations but occasionally words are mis-transcribed. )Electronically signed by Aleksander Brantley MD on 5/10/2022 at 11:24 AM

## 2022-05-10 ENCOUNTER — OFFICE VISIT (OUTPATIENT)
Dept: HEMATOLOGY | Age: 52
End: 2022-05-10
Payer: COMMERCIAL

## 2022-05-10 VITALS
HEART RATE: 98 BPM | OXYGEN SATURATION: 99 % | HEIGHT: 66 IN | DIASTOLIC BLOOD PRESSURE: 82 MMHG | BODY MASS INDEX: 39.05 KG/M2 | WEIGHT: 243 LBS | SYSTOLIC BLOOD PRESSURE: 140 MMHG

## 2022-05-10 DIAGNOSIS — M32.9 SLE (SYSTEMIC LUPUS ERYTHEMATOSUS RELATED SYNDROME) (HCC): ICD-10-CM

## 2022-05-10 DIAGNOSIS — D50.8 OTHER IRON DEFICIENCY ANEMIA: Primary | ICD-10-CM

## 2022-05-10 DIAGNOSIS — M35.9 AUTOIMMUNE DISEASE (HCC): ICD-10-CM

## 2022-05-10 DIAGNOSIS — Z71.85 IMMUNIZATION COUNSELING: ICD-10-CM

## 2022-05-10 DIAGNOSIS — Z71.89 CARE PLAN DISCUSSED WITH PATIENT: ICD-10-CM

## 2022-05-10 DIAGNOSIS — M32.9 LUPUS (HCC): ICD-10-CM

## 2022-05-10 PROCEDURE — 99214 OFFICE O/P EST MOD 30 MIN: CPT | Performed by: INTERNAL MEDICINE

## 2022-05-10 RX ORDER — MEPERIDINE HYDROCHLORIDE 50 MG/ML
12.5 INJECTION INTRAMUSCULAR; INTRAVENOUS; SUBCUTANEOUS PRN
Status: CANCELLED | OUTPATIENT
Start: 2022-05-20

## 2022-05-10 RX ORDER — SODIUM CHLORIDE 0.9 % (FLUSH) 0.9 %
5-40 SYRINGE (ML) INJECTION PRN
Status: CANCELLED | OUTPATIENT
Start: 2022-05-20

## 2022-05-10 RX ORDER — HEPARIN SODIUM (PORCINE) LOCK FLUSH IV SOLN 100 UNIT/ML 100 UNIT/ML
500 SOLUTION INTRAVENOUS PRN
Status: CANCELLED | OUTPATIENT
Start: 2022-05-20

## 2022-05-10 RX ORDER — SODIUM CHLORIDE 9 MG/ML
5-250 INJECTION, SOLUTION INTRAVENOUS PRN
Status: CANCELLED | OUTPATIENT
Start: 2022-05-20

## 2022-05-10 RX ORDER — FAMOTIDINE 10 MG/ML
20 INJECTION, SOLUTION INTRAVENOUS
Status: CANCELLED | OUTPATIENT
Start: 2022-05-20

## 2022-05-10 RX ORDER — EPINEPHRINE 1 MG/ML
0.3 INJECTION, SOLUTION, CONCENTRATE INTRAVENOUS PRN
Status: CANCELLED | OUTPATIENT
Start: 2022-05-20

## 2022-05-10 RX ORDER — ONDANSETRON 2 MG/ML
8 INJECTION INTRAMUSCULAR; INTRAVENOUS
Status: CANCELLED | OUTPATIENT
Start: 2022-05-20

## 2022-05-10 RX ORDER — DIPHENHYDRAMINE HYDROCHLORIDE 50 MG/ML
50 INJECTION INTRAMUSCULAR; INTRAVENOUS
Status: CANCELLED | OUTPATIENT
Start: 2022-05-20

## 2022-05-10 RX ORDER — SODIUM CHLORIDE 9 MG/ML
INJECTION, SOLUTION INTRAVENOUS CONTINUOUS
Status: CANCELLED | OUTPATIENT
Start: 2022-05-20

## 2022-05-10 RX ORDER — ACETAMINOPHEN 325 MG/1
650 TABLET ORAL
Status: CANCELLED | OUTPATIENT
Start: 2022-05-20

## 2022-05-10 RX ORDER — ALBUTEROL SULFATE 90 UG/1
4 AEROSOL, METERED RESPIRATORY (INHALATION) PRN
Status: CANCELLED | OUTPATIENT
Start: 2022-05-20

## 2022-05-20 ENCOUNTER — HOSPITAL ENCOUNTER (OUTPATIENT)
Dept: INFUSION THERAPY | Age: 52
Discharge: HOME | End: 2022-05-20
Payer: COMMERCIAL

## 2022-05-20 VITALS
HEART RATE: 83 BPM | TEMPERATURE: 97.9 F | SYSTOLIC BLOOD PRESSURE: 141 MMHG | WEIGHT: 246.7 LBS | RESPIRATION RATE: 18 BRPM | DIASTOLIC BLOOD PRESSURE: 74 MMHG | HEIGHT: 66 IN | BODY MASS INDEX: 39.65 KG/M2

## 2022-05-20 DIAGNOSIS — D50.8 OTHER IRON DEFICIENCY ANEMIA: Primary | ICD-10-CM

## 2022-05-20 LAB
HCT VFR BLD CALC: 32.5 % (ref 34.1–44.9)
HEMOGLOBIN: 10 G/DL (ref 11.2–15.7)
LYMPHOCYTES ABSOLUTE: 2.15 K/UL (ref 1.18–3.74)
LYMPHOCYTES RELATIVE PERCENT: 19.6 % (ref 19.3–53.1)
MCH RBC QN AUTO: 26.2 PG (ref 25.6–32.2)
MCHC RBC AUTO-ENTMCNC: 30.8 G/DL (ref 32.3–35.5)
MCV RBC AUTO: 85.3 FL (ref 79.4–94.8)
MONOCYTES ABSOLUTE: 0.75 K/UL (ref 0.24–0.82)
MONOCYTES RELATIVE PERCENT: 6.8 % (ref 4.7–12.5)
NEUTROPHILS ABSOLUTE: 7.73 K/UL (ref 1.56–6.13)
NEUTROPHILS RELATIVE PERCENT: 70.3 % (ref 34–71.1)
PDW BLD-RTO: 15 % (ref 11.7–14.4)
PLATELET # BLD: 297 K/UL (ref 182–369)
PMV BLD AUTO: 11.1 FL (ref 7.4–10.4)
RBC # BLD: 3.81 M/UL (ref 3.93–5.22)
WBC # BLD: 10.99 K/UL (ref 3.98–10.04)

## 2022-05-20 PROCEDURE — 6360000002 HC RX W HCPCS: Performed by: INTERNAL MEDICINE

## 2022-05-20 PROCEDURE — 85025 COMPLETE CBC W/AUTO DIFF WBC: CPT

## 2022-05-20 PROCEDURE — 96365 THER/PROPH/DIAG IV INF INIT: CPT

## 2022-05-20 PROCEDURE — 96366 THER/PROPH/DIAG IV INF ADDON: CPT

## 2022-05-20 PROCEDURE — 2580000003 HC RX 258: Performed by: INTERNAL MEDICINE

## 2022-05-20 RX ORDER — ALBUTEROL SULFATE 90 UG/1
4 AEROSOL, METERED RESPIRATORY (INHALATION) PRN
Status: CANCELLED | OUTPATIENT
Start: 2022-05-27

## 2022-05-20 RX ORDER — SODIUM CHLORIDE 9 MG/ML
INJECTION, SOLUTION INTRAVENOUS CONTINUOUS
Status: CANCELLED | OUTPATIENT
Start: 2022-05-27

## 2022-05-20 RX ORDER — ACETAMINOPHEN 325 MG/1
650 TABLET ORAL
Status: CANCELLED | OUTPATIENT
Start: 2022-05-27

## 2022-05-20 RX ORDER — EPINEPHRINE 1 MG/ML
0.3 INJECTION, SOLUTION, CONCENTRATE INTRAVENOUS PRN
Status: CANCELLED | OUTPATIENT
Start: 2022-05-27

## 2022-05-20 RX ORDER — DIPHENHYDRAMINE HYDROCHLORIDE 50 MG/ML
50 INJECTION INTRAMUSCULAR; INTRAVENOUS
Status: CANCELLED | OUTPATIENT
Start: 2022-05-27

## 2022-05-20 RX ORDER — MEPERIDINE HYDROCHLORIDE 25 MG/ML
12.5 INJECTION INTRAMUSCULAR; INTRAVENOUS; SUBCUTANEOUS PRN
Status: CANCELLED | OUTPATIENT
Start: 2022-05-27

## 2022-05-20 RX ORDER — SODIUM CHLORIDE 9 MG/ML
5-250 INJECTION, SOLUTION INTRAVENOUS PRN
Status: CANCELLED | OUTPATIENT
Start: 2022-05-27

## 2022-05-20 RX ORDER — SODIUM CHLORIDE 0.9 % (FLUSH) 0.9 %
5-40 SYRINGE (ML) INJECTION PRN
Status: CANCELLED | OUTPATIENT
Start: 2022-05-27

## 2022-05-20 RX ORDER — FAMOTIDINE 10 MG/ML
20 INJECTION, SOLUTION INTRAVENOUS
Status: CANCELLED | OUTPATIENT
Start: 2022-05-27

## 2022-05-20 RX ORDER — ONDANSETRON 2 MG/ML
8 INJECTION INTRAMUSCULAR; INTRAVENOUS
Status: CANCELLED | OUTPATIENT
Start: 2022-05-27

## 2022-05-20 RX ORDER — HEPARIN SODIUM (PORCINE) LOCK FLUSH IV SOLN 100 UNIT/ML 100 UNIT/ML
500 SOLUTION INTRAVENOUS PRN
Status: CANCELLED | OUTPATIENT
Start: 2022-05-27

## 2022-05-20 RX ADMIN — IRON SUCROSE 500 MG: 20 INJECTION, SOLUTION INTRAVENOUS at 13:01

## 2022-05-27 ENCOUNTER — HOSPITAL ENCOUNTER (OUTPATIENT)
Dept: INFUSION THERAPY | Age: 52
Discharge: HOME | End: 2022-05-27
Payer: COMMERCIAL

## 2022-05-27 VITALS
TEMPERATURE: 98.1 F | DIASTOLIC BLOOD PRESSURE: 85 MMHG | SYSTOLIC BLOOD PRESSURE: 154 MMHG | WEIGHT: 237.6 LBS | HEART RATE: 83 BPM | RESPIRATION RATE: 16 BRPM | OXYGEN SATURATION: 98 % | HEIGHT: 66 IN | BODY MASS INDEX: 38.18 KG/M2

## 2022-05-27 DIAGNOSIS — D50.8 OTHER IRON DEFICIENCY ANEMIA: Primary | ICD-10-CM

## 2022-05-27 LAB
HCT VFR BLD CALC: 37.6 % (ref 34.1–44.9)
HEMOGLOBIN: 11.4 G/DL (ref 11.2–15.7)
LYMPHOCYTES ABSOLUTE: 2.04 K/UL (ref 1.18–3.74)
LYMPHOCYTES RELATIVE PERCENT: 19.5 % (ref 19.3–53.1)
MCH RBC QN AUTO: 26.1 PG (ref 25.6–32.2)
MCHC RBC AUTO-ENTMCNC: 30.3 G/DL (ref 32.3–35.5)
MCV RBC AUTO: 86.2 FL (ref 79.4–94.8)
MONOCYTES ABSOLUTE: 0.93 K/UL (ref 0.24–0.82)
MONOCYTES RELATIVE PERCENT: 8.9 % (ref 4.7–12.5)
NEUTROPHILS ABSOLUTE: 7.18 K/UL (ref 1.56–6.13)
NEUTROPHILS RELATIVE PERCENT: 68.8 % (ref 34–71.1)
PDW BLD-RTO: 16.1 % (ref 11.7–14.4)
PLATELET # BLD: 310 K/UL (ref 182–369)
PMV BLD AUTO: 11.6 FL (ref 7.4–10.4)
RBC # BLD: 4.36 M/UL (ref 3.93–5.22)
WBC # BLD: 10.44 K/UL (ref 3.98–10.04)

## 2022-05-27 PROCEDURE — 2580000003 HC RX 258: Performed by: INTERNAL MEDICINE

## 2022-05-27 PROCEDURE — 6360000002 HC RX W HCPCS: Performed by: INTERNAL MEDICINE

## 2022-05-27 PROCEDURE — 96366 THER/PROPH/DIAG IV INF ADDON: CPT

## 2022-05-27 PROCEDURE — 96365 THER/PROPH/DIAG IV INF INIT: CPT

## 2022-05-27 PROCEDURE — 85025 COMPLETE CBC W/AUTO DIFF WBC: CPT

## 2022-05-27 RX ORDER — SODIUM CHLORIDE 9 MG/ML
5-250 INJECTION, SOLUTION INTRAVENOUS PRN
Status: CANCELLED | OUTPATIENT
Start: 2022-06-03

## 2022-05-27 RX ORDER — ALBUTEROL SULFATE 90 UG/1
4 AEROSOL, METERED RESPIRATORY (INHALATION) PRN
Status: CANCELLED | OUTPATIENT
Start: 2022-06-03

## 2022-05-27 RX ORDER — ONDANSETRON 2 MG/ML
8 INJECTION INTRAMUSCULAR; INTRAVENOUS
Status: CANCELLED | OUTPATIENT
Start: 2022-06-03

## 2022-05-27 RX ORDER — MEPERIDINE HYDROCHLORIDE 25 MG/ML
12.5 INJECTION INTRAMUSCULAR; INTRAVENOUS; SUBCUTANEOUS PRN
Status: CANCELLED | OUTPATIENT
Start: 2022-06-03

## 2022-05-27 RX ORDER — SODIUM CHLORIDE 9 MG/ML
INJECTION, SOLUTION INTRAVENOUS CONTINUOUS
Status: CANCELLED | OUTPATIENT
Start: 2022-06-03

## 2022-05-27 RX ORDER — SODIUM CHLORIDE 0.9 % (FLUSH) 0.9 %
5-40 SYRINGE (ML) INJECTION PRN
Status: CANCELLED | OUTPATIENT
Start: 2022-06-03

## 2022-05-27 RX ORDER — HEPARIN SODIUM (PORCINE) LOCK FLUSH IV SOLN 100 UNIT/ML 100 UNIT/ML
500 SOLUTION INTRAVENOUS PRN
Status: CANCELLED | OUTPATIENT
Start: 2022-06-03

## 2022-05-27 RX ORDER — DIPHENHYDRAMINE HYDROCHLORIDE 50 MG/ML
50 INJECTION INTRAMUSCULAR; INTRAVENOUS
Status: CANCELLED | OUTPATIENT
Start: 2022-06-03

## 2022-05-27 RX ORDER — ACETAMINOPHEN 325 MG/1
650 TABLET ORAL
Status: CANCELLED | OUTPATIENT
Start: 2022-06-03

## 2022-05-27 RX ORDER — EPINEPHRINE 1 MG/ML
0.3 INJECTION, SOLUTION, CONCENTRATE INTRAVENOUS PRN
Status: CANCELLED | OUTPATIENT
Start: 2022-06-03

## 2022-05-27 RX ORDER — FAMOTIDINE 10 MG/ML
20 INJECTION, SOLUTION INTRAVENOUS
Status: CANCELLED | OUTPATIENT
Start: 2022-06-03

## 2022-05-27 RX ADMIN — IRON SUCROSE 500 MG: 20 INJECTION, SOLUTION INTRAVENOUS at 11:25

## 2022-06-21 ENCOUNTER — OFFICE VISIT (OUTPATIENT)
Dept: HEMATOLOGY | Age: 52
End: 2022-06-21
Payer: COMMERCIAL

## 2022-06-21 VITALS
BODY MASS INDEX: 38.89 KG/M2 | SYSTOLIC BLOOD PRESSURE: 130 MMHG | DIASTOLIC BLOOD PRESSURE: 82 MMHG | HEIGHT: 66 IN | WEIGHT: 242 LBS

## 2022-06-21 DIAGNOSIS — D50.8 OTHER IRON DEFICIENCY ANEMIA: Primary | ICD-10-CM

## 2022-06-21 DIAGNOSIS — M32.9 SLE (SYSTEMIC LUPUS ERYTHEMATOSUS RELATED SYNDROME) (HCC): ICD-10-CM

## 2022-06-21 DIAGNOSIS — Z71.89 CARE PLAN DISCUSSED WITH PATIENT: ICD-10-CM

## 2022-06-21 PROCEDURE — 99213 OFFICE O/P EST LOW 20 MIN: CPT | Performed by: INTERNAL MEDICINE

## 2022-09-12 NOTE — PROGRESS NOTES
MEDICAL HEMATOLOGY/ONCOLOGY PROGRESS NOTE       Radha Dumont   1970 9/13/2022     Chief Complaint   Patient presents with    Follow-up     Other iron deficiency anemia          INTERVAL HISTORY/HISTORY OF PRESENT ILLNESS:    Diagnosis  Iron deficiency anemia, July 2019  Negative upper/lower GI scope August 2019    Treatment summary  Failed p.o. iron replacement/intolerance to p.o. iron replacement  2/28/2020- IV iron replacement with Injectafer 750 mg  5/28/21 & 6/4/21 IV iron replacement with Injectafer 750 mg x2  05/27/22-plan for Venofer 1000mg IV    Hematology history  The patient has been diagnosed with iron deficiency anemia. She presents today for a routine follow-up visit. She received iron therapy in June 2021. She is followed by rheumatology in [place]. She denies any overt hematuria, hematochezia. She does take PPIs for GERD symptoms. She had labs done today. No new complaints. Hematology history  Radha Dumont is a 46 y.o.  female referred by LYLE Wellington for new onset iron deficiency anemia. 7/18/19 CBC revealed a WBC of 13.3 with 77% PMN, 14% lymphocytes, 6% monocytes, 2% eosinophils, 1% basophils, Hgb 9.7, MCV 74, MCHC 31.8, ,000.  7/18/19 CMP was WNL. ESR 5 (0-40)  7/25/19 CBC revealed a WBC of 10.6, Hgb 9.3, MCV 77.5, MCHC 30, ,000. She was started on Niferex-150 daily and omeprazole was increased to twice daily. 8/20/19 Follow-up CBC revealed a WBC of 9.9, Hgb 9.9, MCV 76.7, MCHC 30, ,000. PRIOR CBCS  1/20/2018, WBC 11.9, Hgb 12.5, MCV 87.9, ,000  1/18/2017, WBC 10.4 with normal percent differential, Hgb 12.6, MCV 89.3, ,000  3/28/2017, WBC 10.1, Hgb 13, MCV 90.8, ,000  8/27/19 Endoscopy was performed by Dr. Giron Nest was negative except for some diminutive gastric polyps with small bowel biopsies negative for sprue.   8/27/2019- upper endoscopy showed Esophagus: normal; EGJ at 40 cm and normal.There is NO hiatal hernia present. Stomach:  Normal except for a few 3-5 mm sessile hyperplastic appearing polyps in the body likely from her use of acid suppression medications. NO overt atrophic gastritis. Rugae were normal. Lumen distended well with insufflation. Duodenum: normal; random biopsies were taken to check for Celiac disease/villous atrophy  8/28/19 Colonoscopy performed by Dr. Francia Boo revealed left-sided diverticulosis, grade 1 internal hemorrhoids. Small bowel evaluation has not been performed yet. Occult bloody stools x3- negative  1/3/2020- laboratory studies showed CBC showed WBC 12.4, hemoglobin 11.5/MCV 80, RDW 18, platelet counts 162,907. Iron profile showed iron 44, TIBC 419, U , iron saturation 11%, ferritin 10. CMP showed normal kidney function creatinine 0.8. LFTs normal.  Otherwise unremarkable. 1/7/2020-poor tolerance to p.o. iron with complains of abdominal pain, nausea constipation. Therefore recommend IV iron replacement with Injectafer 750mg x 2 doses. 2/18/2020-WBC 10.3, hemoglobin 13.2/MCV 86, platelets 610,906. Therefore, improvement of her anemia. 6/4/21 Stool occult blood-positive x2,  negative x1  5/28/21 & 6/4/21 IV iron replacement with Injectafer 750 mg x2 doses  07/03/21-Iron 62, TIBC 229, %sat 27, Ferritin 284, WBC12.9, Hb 12.3/MCV82, Platelets 702T   3/42/8515- Endoscope Capsule- Normal Duodenal,Jejunal and Ileal mucosa. No obvious ulcers or erosion or masses or strictures or strenuous or AVMs or evidence of active or recent bleeding were detected on this study. 10/6/2021 XR Abdomen(single) Large amount of stool in colon. No evidence of obstruction or ileus. 11/09/21-CBC 13.1, Hb 13.1, platelets 435Z.   47/31/75-BNM 12.4, Hb 10.9/85, Platelets 995G  6/1/5337-SNVT Studies [facility]) Iron 33, TIBC 351, Iron Sat 9, Ferritin 13. Poor oral iron tolerance.   05/27/2022-Venofer 1000mg IV.  06/21/2022- WBC 12.25, Hb 12.5 ( previously 10.9), Platelets 341S  5/13/0078 Iron Studies [facility]): Iron 62, Tibc 332, Iron Sat 19, Ferritin 136  9/13/2022-creatinine 1.05, LFTs normal, ferritin 16, iron 48, iron saturation 14%, TIBC 340, U .   Hemoglobin 11.2        PAST MEDICAL HISTORY:   Past Medical History:   Diagnosis Date    Anemia     Arthritis     RA    Fibromyalgia     GERD (gastroesophageal reflux disease)     Hypothyroidism     hypothyroidism    Iron deficiency anemia 1/6/2020    [address]upus ([facility] [address]     Sjogren's disease ([facility] [address]           PAST SURGICAL HISTORY:  Past Surgical History:   Procedure Laterality Date    CHOLECYSTECTOMY      COLONOSCOPY N/A 8/27/2019    Dr Kam Taylor to 8/28/19 due to prep    COLONOSCOPY N/A 8/28/2019    Dr Yaa Rawls hemorrhoids-Grade 1, suboptimal prep, diverticulosis, 5 yr recall    OVARY REMOVAL Right     UPPER GASTROINTESTINAL ENDOSCOPY N/A 8/27/2019    Dr Eva Maurer appearing gastric polyps        SOCIAL HISTORY:  Social History     Socioeconomic History    Marital status:    Tobacco Use    Smoking status: Never    Smokeless tobacco: Never   Vaping Use    Vaping Use: Never used   Substance and Sexual Activity    Alcohol use: Never    Drug use: Never       FAMILY HISTORY:  Family History   Problem Relation Age of Onset    Colon Cancer Neg Hx     Colon Polyps Neg Hx     Esophageal Cancer Neg Hx     Liver Cancer Neg Hx     Liver Disease Neg Hx     Rectal Cancer Neg Hx     Stomach Cancer Neg Hx         Current Outpatient Medications   Medication Sig Dispense Refill    hydroCHLOROthiazide (MICROZIDE) 12.5 MG capsule Take 12.5 mg by mouth every morning       DULoxetine (CYMBALTA) 60 MG extended release capsule Take 60 mg by mouth every morning      Coenzyme Q10 (CO Q10) 100 MG CAPS Take by mouth daily      ondansetron (ZOFRAN) 4 MG tablet Take 1 tablet by mouth every 8 hours as needed for Nausea or Vomiting 3 tablet 0    Ascorbic Acid (VITAMIN C) 1000 MG tablet Take 1,000 mg by mouth daily      calcium acetate-magnesium carb 450-200 MG TABS Take by mouth 2 times daily      omeprazole (PRILOSEC) 20 MG delayed release capsule Take 20 mg by mouth daily      Fe Bisgly-Succ-C-Thre-B12-FA (IRON-150 PO) Take by mouth daily       predniSONE (DELTASONE) 10 MG tablet Take 10 mg by mouth daily      hydroxychloroquine (PLAQUENIL) 200 MG tablet Take by mouth 2 times daily       gabapentin (NEURONTIN) 300 MG capsule Take 300 mg by mouth 3 times daily. levothyroxine (SYNTHROID) 150 MCG tablet Take 150 mcg by mouth Daily       folic acid (FOLVITE) 1 MG tablet Take 1 mg by mouth daily      DULoxetine (CYMBALTA) 30 MG extended release capsule Take 30 mg by mouth nightly        No current facility-administered medications for this visit.       REVIEW OF SYSTEMS:   CONSTITUTIONAL: no fever, no night sweats, no fatigue;  HEENT: no blurring of vision, no double vision, no hearing difficulty, no tinnitus, no ulceration, no dysplasia, no epistaxis;   LUNGS: no cough, no hemoptysis, no wheeze,  no shortness of breath;  CARDIOVASCULAR: no palpitation, no chest pain, no shortness of breath;  GI: no abdominal pain, no nausea, no vomiting, no diarrhea, no constipation;  JEREMY: no dysuria, no hematuria, no frequency or urgency, no nephrolithiasis;  MUSCULOSKELETAL: no joint pain, no swelling, no stiffness;  ENDOCRINE: no polyuria, no polydipsia, no cold or heat intolerance;  HEMATOLOGY: no easy bruising or bleeding, no history of clotting disorder;  DERMATOLOGY: no skin rash, no eczema, no pruritus;  PSYCHIATRY: no depression, no anxiety, no panic attacks, no suicidal ideation, no homicidal ideation;  NEUROLOGY: no syncope, no seizures, no numbness or tingling of hands, no numbness or tingling of feet, no paresis;    Vitals signs:  BP (!) 142/90   Pulse 97   Temp 98.1 °F (36.7 °C) (Oral)   Resp 18   Wt 244 lb (110.7 kg)   SpO2 97%   BMI 39.38 kg/m²    Pain scale:      PHYSICAL EXAM:  CONSTITUTIONAL: Alert, appropriate, no acute distress  EYES: Non icteric, EOM intact, pupils equal round   ENT: Mucus membranes moist, no oral pharyngeal lesions, external inspection of ears and nose are normal.  NECK: Supple, no masses. No palpable thyroid mass  CHEST/LUNGS: CTA bilaterally, normal respiratory effort   CARDIOVASCULAR: RRR, no murmurs. No lower extremity edema  ABDOMEN: soft non-tender, active bowel sounds, no HSM. No palpable masses  EXTREMITIES: warm, full ROM in all 4 extremities, no focal weakness. SKIN: warm, dry with no rashes or lesions  LYMPH: No cervical, clavicular, axillary, or inguinal lymphadenopathy  NEUROLOGIC: follows commands, non focal   PSYCH: mood and affect appropriate. Alert and oriented to time, place, person    Relevant Lab findings/reviewed by me:  6/23/2022 Iron Studies [facility]): Iron 62, Tibc 332, Iron Sat 19, Ferritin 136    Relevant Imaging studies/reviewed by me:  None    ASSESSMENT:    No orders of the defined types were placed in this encounter. Cody Ramirez was seen today for follow-up. Diagnoses and all orders for this visit:    Other iron deficiency anemia    Care plan discussed with patient    SLE (systemic lupus erythematosus related syndrome) ([facility] [address]    Autoimmune disease ([facility] [address]         #Iron deficiency anemia- iron profile consistent with iron deficiency anemia. Upper and lower endoscopy was unrevealing. She did  have a capsule endoscopy performed that was also unremarkable. Occult blood x3 positive in 2020. Patient received IV iron therapy last year. She had very poor tolerance to PO iron replacement. She received IV iron replacement with Injectafer 750 mg x 2 dose February 2020. Iron profile now with severe iron deficiency. 5/28/21 & 6/4/21 IV iron replacement with Injectafer 750 mg x2 doses  07/03/21-Iron 62, TIBC 229, %sat 27, Ferritin 284, WBC12.9, Hb 12.3/MCV82, Platelets 355V   Unremarkable capsule endoscopy:. NormalDuodenal,JejunalandIlealmucosa. NO obvious ulcers or erosions or masses or strictures or stenosis  or AVMs or evidence of active or recent bleeding were detected on this study. CBC today showed normal hemoglobin 10.9, Ferritin 13, iron sat 9%  -05/27/22- Recommended Venofer 500mg IV x 2 doses  -Hb 12.5, Platelets 332R with significant improvement of Hb  -09/13/22- WBC 10.1, Hb 11.2, Plts 354K    9/13/2022-creatinine 1.05, LFTs normal, ferritin 16, iron 48, iron saturation 14%, TIBC 340, U . Hemoglobin 11.2    Plan:   Plan for Injectafer 1500 mg IV    Oral iron intolerance    Hypertension-BP (!) 142/90   Pulse 97   Temp 98.1 °F (36.7 °C) (Oral)   Resp 18   Wt 244 lb (110.7 kg)   SpO2 97%   BMI 39.38 kg/m²      COVID-19-patient has not received COVID 19 vaccination yet. SLE  -Plaquenil   -Prednisone    Neuropathy  -Cymbalta  -Gabapentin    Plan:  RTC with MD in 3 months  Continue follow-up with PCP   CBC, iron studies, ferritin in 3 months-prior to next visit  Plan for Injectafer 1500mg       Follow Up:      No follow-ups on file. Data Colonel Karla am pre charting  as Medical Assistant for Donnie Bruce MD. Electronically signed by Fern Larios MA on 9/13/2022 at 3:06 PM CDT. Scar Jama am scribing for Donnie Bruce MD. Electronically signed by Wilda Rodriguez RN on 9/13/2022 at 12:08 PM CDT. I, Dr Shey Pandey, personally performed the services described in this documentation as scribed by Wilda Rodriguez RN in my presence and is both accurate and complete. I have seen, examined and reviewed this patient medication list, appropriate labs and imaging studies. I reviewed relevant medical records and others physicians notes. I discussed the plans of care with the patient. I answered all the questions to the patients satisfaction. I have also reviewed the chief complaint (CC) and part of the history (History of Present Illness (HPI), Past Family Social History [facility]), or Review of Systems (ROS) and made changes when appropriated.        (Please note that portions of this note were completed with a voice recognition program. Efforts were made to edit the dictations but occasionally words are mis-transcribed.)  Electronically signed by Ana Rosa Singh MD on 9/13/2022 at 12:08 PM

## 2022-09-13 ENCOUNTER — OFFICE VISIT (OUTPATIENT)
Dept: HEMATOLOGY | Age: 52
End: 2022-09-13
Payer: COMMERCIAL

## 2022-09-13 VITALS
OXYGEN SATURATION: 97 % | BODY MASS INDEX: 39.38 KG/M2 | SYSTOLIC BLOOD PRESSURE: 142 MMHG | DIASTOLIC BLOOD PRESSURE: 90 MMHG | WEIGHT: 244 LBS | HEART RATE: 97 BPM | RESPIRATION RATE: 18 BRPM | TEMPERATURE: 98.1 F

## 2022-09-13 DIAGNOSIS — M32.9 SLE (SYSTEMIC LUPUS ERYTHEMATOSUS RELATED SYNDROME) (HCC): ICD-10-CM

## 2022-09-13 DIAGNOSIS — D50.8 OTHER IRON DEFICIENCY ANEMIA: Primary | ICD-10-CM

## 2022-09-13 DIAGNOSIS — Z71.89 CARE PLAN DISCUSSED WITH PATIENT: ICD-10-CM

## 2022-09-13 DIAGNOSIS — M35.9 AUTOIMMUNE DISEASE (HCC): ICD-10-CM

## 2022-09-13 PROCEDURE — 99213 OFFICE O/P EST LOW 20 MIN: CPT | Performed by: INTERNAL MEDICINE

## 2022-09-14 ENCOUNTER — TELEPHONE (OUTPATIENT)
Dept: HEMATOLOGY | Age: 52
End: 2022-09-14

## 2022-09-14 NOTE — TELEPHONE ENCOUNTER
Called patient to notify patient that Peggy Lin has reviewed Iron labs and looks like her Iron has decreased. Explained to patient that she will have to have IV Venofer again. Patient will be notified of appointment once built and approved by insurance.

## 2022-09-22 ENCOUNTER — HOSPITAL ENCOUNTER (OUTPATIENT)
Dept: INFUSION THERAPY | Age: 52
Setting detail: INFUSION SERIES
Discharge: HOME | End: 2022-09-22
Payer: COMMERCIAL

## 2022-09-22 VITALS
RESPIRATION RATE: 17 BRPM | HEART RATE: 87 BPM | DIASTOLIC BLOOD PRESSURE: 71 MMHG | TEMPERATURE: 97 F | OXYGEN SATURATION: 97 % | SYSTOLIC BLOOD PRESSURE: 120 MMHG

## 2022-09-22 DIAGNOSIS — D50.8 OTHER IRON DEFICIENCY ANEMIA: Primary | ICD-10-CM

## 2022-09-22 PROCEDURE — 6360000002 HC RX W HCPCS: Performed by: INTERNAL MEDICINE

## 2022-09-22 PROCEDURE — 96365 THER/PROPH/DIAG IV INF INIT: CPT

## 2022-09-22 PROCEDURE — 2580000003 HC RX 258: Performed by: INTERNAL MEDICINE

## 2022-09-22 RX ORDER — SODIUM CHLORIDE 9 MG/ML
5-250 INJECTION, SOLUTION INTRAVENOUS PRN
Status: CANCELLED | OUTPATIENT
Start: 2022-09-29

## 2022-09-22 RX ORDER — SODIUM CHLORIDE 9 MG/ML
5-250 INJECTION, SOLUTION INTRAVENOUS PRN
Status: DISCONTINUED | OUTPATIENT
Start: 2022-09-22 | End: 2022-09-23 | Stop reason: HOSPADM

## 2022-09-22 RX ORDER — EPINEPHRINE 1 MG/ML
0.3 INJECTION, SOLUTION, CONCENTRATE INTRAVENOUS PRN
Status: CANCELLED | OUTPATIENT
Start: 2022-09-22

## 2022-09-22 RX ORDER — ALBUTEROL SULFATE 90 UG/1
4 AEROSOL, METERED RESPIRATORY (INHALATION) PRN
OUTPATIENT
Start: 2022-09-29

## 2022-09-22 RX ORDER — ONDANSETRON 2 MG/ML
8 INJECTION INTRAMUSCULAR; INTRAVENOUS
Status: CANCELLED | OUTPATIENT
Start: 2022-09-22

## 2022-09-22 RX ORDER — EPINEPHRINE 1 MG/ML
0.3 INJECTION, SOLUTION, CONCENTRATE INTRAVENOUS PRN
OUTPATIENT
Start: 2022-09-29

## 2022-09-22 RX ORDER — DIPHENHYDRAMINE HYDROCHLORIDE 50 MG/ML
50 INJECTION INTRAMUSCULAR; INTRAVENOUS
OUTPATIENT
Start: 2022-09-29

## 2022-09-22 RX ORDER — SODIUM CHLORIDE 9 MG/ML
INJECTION, SOLUTION INTRAVENOUS CONTINUOUS
Status: CANCELLED | OUTPATIENT
Start: 2022-09-22

## 2022-09-22 RX ORDER — ACETAMINOPHEN 325 MG/1
650 TABLET ORAL
OUTPATIENT
Start: 2022-09-29

## 2022-09-22 RX ORDER — ONDANSETRON 2 MG/ML
8 INJECTION INTRAMUSCULAR; INTRAVENOUS
OUTPATIENT
Start: 2022-09-29

## 2022-09-22 RX ORDER — ALBUTEROL SULFATE 90 UG/1
4 AEROSOL, METERED RESPIRATORY (INHALATION) PRN
Status: CANCELLED | OUTPATIENT
Start: 2022-09-22

## 2022-09-22 RX ORDER — DIPHENHYDRAMINE HYDROCHLORIDE 50 MG/ML
50 INJECTION INTRAMUSCULAR; INTRAVENOUS
Status: CANCELLED | OUTPATIENT
Start: 2022-09-22

## 2022-09-22 RX ORDER — SODIUM CHLORIDE 9 MG/ML
INJECTION, SOLUTION INTRAVENOUS CONTINUOUS
OUTPATIENT
Start: 2022-09-29

## 2022-09-22 RX ORDER — ACETAMINOPHEN 325 MG/1
650 TABLET ORAL
Status: CANCELLED | OUTPATIENT
Start: 2022-09-22

## 2022-09-22 RX ADMIN — SODIUM CHLORIDE 20 ML/HR: 9 INJECTION, SOLUTION INTRAVENOUS at 13:32

## 2022-09-22 RX ADMIN — FERRIC CARBOXYMALTOSE INJECTION 750 MG: 50 INJECTION, SOLUTION INTRAVENOUS at 13:32

## 2022-09-22 NOTE — DISCHARGE INSTRUCTIONS
ferric carboxymaltose  Pronunciation:  AGUILAR ik lori BOX ee JOSEPHL julita  Brand:  Injectafer  What is the most important information I should know about ferric carboxymaltose? Use only as directed. Tell your doctor if you use other medicines or have other medical conditions or allergies. What is ferric carboxymaltose? Ferric carboxymaltose is an iron replacement product that is used in adults used to treat iron deficiency anemia (GEN), which is low red blood cells caused by a lack of iron in the body. Ferric carboxymaltose is given to adults with GEN and chronic kidney disease (not on dialysis), or to adults with GEN when iron taken by mouth is not effective. Ferric carboxymaltose may also be used for purposes not listed in this medication guide. What should I discuss with my healthcare provider before receiving ferric carboxymaltose? You should not use ferric carboxymaltose if you are allergic to it. Tell your doctor if you have ever had:  high blood pressure; or  an allergic reaction to iron injected into a vein. Tell your doctor if you are pregnant or plan to become pregnant. It is not known if ferric carboxymaltose will harm an unborn baby, but this medicine may cause severe reactions in the mother that could affect the baby's heartbeat. Having iron deficiency anemia during pregnancy may increase the risk of premature birth or low birth weight. The benefit of treating this condition with ferric carboxymaltose may outweigh any risks to the baby. If you are breastfeeding, tell your doctor if you notice diarrhea or constipation in the nursing baby. How is ferric carboxymaltose given? Ferric carboxymaltose is injected into a vein by a healthcare provider. Tell your medical caregivers if you feel any burning or pain when ferric carboxymaltose is injected. You will be watched for at least 30 minutes to make sure you do not have an allergic reaction.   This medicine is usually given in 2 doses, 7 days apart. You may need frequent medical tests, even if you have no symptoms. What happens if I miss a dose? Call your doctor for instructions if you miss an appointment for your ferric carboxymaltose injection. What happens if I overdose? Seek emergency medical attention or call the Poison Help line at 1-802.325.8136. Overdose symptoms may include pain, cough, wheezing, shortness of breath, coughing up blood, weight loss, or slowed growth in a child. What should I avoid after receiving ferric carboxymaltose? Do not take iron supplements or a vitamin/mineral supplement that your doctor has not prescribed or recommended. What are the possible side effects of ferric carboxymaltose? Get emergency medical help if you have signs of an allergic reaction: hives; feeling like you might pass out; wheezing, difficult breathing; swelling of your face, lips, tongue, or throat. Call your doctor at once if you have:  increased blood pressure --dizziness, nausea, sudden warmth or redness in your face, severe headache, pounding in your neck or ears;  low levels of phosphorus in your blood --confusion, bone pain, muscle weakness; or  high levels of iron stored in your body --feeling weak or tired, joint pain, finger pain, stomach pain, weight loss, irregular heartbeats, fluttering in your chest.  Common side effects may include:  nausea;  dizziness;  high blood pressure;  flushing (warmth, redness, or tingly feeling); or  low phosphorus levels. This is not a complete list of side effects and others may occur. Call your doctor for medical advice about side effects. You may report side effects to FDA at 5-560-ZVE-8194. What other drugs will affect ferric carboxymaltose? Other drugs may affect ferric carboxymaltose, including prescription and over-the-counter medicines, vitamins, and herbal products. Tell your doctor about all other medicines you use. Where can I get more information?   Your pharmacist can provide more information about ferric carboxymaltose. Remember, keep this and all other medicines out of the reach of children, never share your medicines with others, and use this medication only for the indication prescribed. Every effort has been made to ensure that the information provided by [address] is accurate, up-to-date, and complete, but no guarantee is made to that effect. Drug information contained herein may be time sensitive. [facility] information has been compiled for use by healthcare practitioners and consumers in the [place] and therefore [facility] does not warrant that uses outside of the [place] are appropriate, unless specifically indicated otherwise. [facility]'s drug information does not endorse drugs, diagnose patients or recommend therapy. [facility]'s drug information is an informational resource designed to assist licensed healthcare practitioners in caring for their patients and/or to serve consumers viewing this service as a supplement to, and not a substitute for, the expertise, skill, knowledge and judgment of healthcare practitioners. The absence of a warning for a given drug or drug combination in no way should be construed to indicate that the drug or drug combination is safe, effective or appropriate for any given patient. [facility] does not assume any responsibility for any aspect of healthcare administered with the aid of information [facility] provides. The information contained herein is not intended to cover all possible uses, directions, precautions, warnings, drug interactions, allergic reactions, or adverse effects. If you have questions about the drugs you are taking, check with your doctor, nurse or pharmacist.  Copyright 2217-7315 [address] [place]. Version: 3.01. Revision date: 9/24/2021. Care instructions adapted under license by [facility] ([facility]).  If you have questions about a medical condition or this instruction, always ask your healthcare professional. [employer] disclaims any warranty or liability for your use of this information.

## 2022-09-23 ENCOUNTER — HOSPITAL ENCOUNTER (OUTPATIENT)
Dept: INFUSION THERAPY | Age: 52
Setting detail: INFUSION SERIES
End: 2022-09-23
Payer: COMMERCIAL

## 2022-09-29 ENCOUNTER — HOSPITAL ENCOUNTER (OUTPATIENT)
Dept: INFUSION THERAPY | Age: 52
Setting detail: INFUSION SERIES
Discharge: HOME | End: 2022-09-29
Payer: COMMERCIAL

## 2022-09-29 VITALS
OXYGEN SATURATION: 94 % | HEART RATE: 112 BPM | DIASTOLIC BLOOD PRESSURE: 66 MMHG | SYSTOLIC BLOOD PRESSURE: 116 MMHG | TEMPERATURE: 98.4 F | RESPIRATION RATE: 18 BRPM

## 2022-09-29 DIAGNOSIS — D50.8 OTHER IRON DEFICIENCY ANEMIA: Primary | ICD-10-CM

## 2022-09-29 PROBLEM — L93.0 LUPUS ERYTHEMATOSUS: Status: ACTIVE | Noted: 2019-09-06

## 2022-09-29 PROBLEM — E03.9 HYPOTHYROIDISM: Status: ACTIVE | Noted: 2022-01-25

## 2022-09-29 PROBLEM — R76.8 ANTINUCLEAR FACTOR POSITIVE: Status: ACTIVE | Noted: 2022-09-29

## 2022-09-29 PROBLEM — I10 ESSENTIAL HYPERTENSION: Status: ACTIVE | Noted: 2022-01-25

## 2022-09-29 PROBLEM — M79.7 FIBROMYALGIA: Status: ACTIVE | Noted: 2021-06-23

## 2022-09-29 PROCEDURE — 96365 THER/PROPH/DIAG IV INF INIT: CPT

## 2022-09-29 PROCEDURE — 6360000002 HC RX W HCPCS: Performed by: INTERNAL MEDICINE

## 2022-09-29 PROCEDURE — 2580000003 HC RX 258: Performed by: INTERNAL MEDICINE

## 2022-09-29 RX ORDER — DIPHENHYDRAMINE HYDROCHLORIDE 50 MG/ML
50 INJECTION INTRAMUSCULAR; INTRAVENOUS
OUTPATIENT
Start: 2022-09-29

## 2022-09-29 RX ORDER — EPINEPHRINE 1 MG/ML
0.3 INJECTION, SOLUTION, CONCENTRATE INTRAVENOUS PRN
OUTPATIENT
Start: 2022-09-29

## 2022-09-29 RX ORDER — ALBUTEROL SULFATE 90 UG/1
4 AEROSOL, METERED RESPIRATORY (INHALATION) PRN
OUTPATIENT
Start: 2022-09-29

## 2022-09-29 RX ORDER — SODIUM CHLORIDE 9 MG/ML
5-250 INJECTION, SOLUTION INTRAVENOUS PRN
Status: CANCELLED | OUTPATIENT
Start: 2022-09-29

## 2022-09-29 RX ORDER — SODIUM CHLORIDE 9 MG/ML
INJECTION, SOLUTION INTRAVENOUS CONTINUOUS
OUTPATIENT
Start: 2022-09-29

## 2022-09-29 RX ORDER — SODIUM CHLORIDE 9 MG/ML
5-250 INJECTION, SOLUTION INTRAVENOUS PRN
Status: DISCONTINUED | OUTPATIENT
Start: 2022-09-29 | End: 2022-09-30 | Stop reason: HOSPADM

## 2022-09-29 RX ORDER — ONDANSETRON 2 MG/ML
8 INJECTION INTRAMUSCULAR; INTRAVENOUS
OUTPATIENT
Start: 2022-09-29

## 2022-09-29 RX ORDER — ACETAMINOPHEN 325 MG/1
650 TABLET ORAL
OUTPATIENT
Start: 2022-09-29

## 2022-09-29 RX ADMIN — FERRIC CARBOXYMALTOSE INJECTION 750 MG: 50 INJECTION, SOLUTION INTRAVENOUS at 13:19

## 2022-09-29 NOTE — DISCHARGE INSTRUCTIONS
ferric carboxymaltose  Pronunciation:  AGUILAR ik lori BOX ee JOSEPHL julita  Brand:  Injectafer  What is the most important information I should know about ferric carboxymaltose? Use only as directed. Tell your doctor if you use other medicines or have other medical conditions or allergies. What is ferric carboxymaltose? Ferric carboxymaltose is an iron replacement product that is used in adults used to treat iron deficiency anemia (GEN), which is low red blood cells caused by a lack of iron in the body. Ferric carboxymaltose is given to adults with GEN and chronic kidney disease (not on dialysis), or to adults with GEN when iron taken by mouth is not effective. Ferric carboxymaltose may also be used for purposes not listed in this medication guide. What should I discuss with my healthcare provider before receiving ferric carboxymaltose? You should not use ferric carboxymaltose if you are allergic to it. Tell your doctor if you have ever had:  high blood pressure; or  an allergic reaction to iron injected into a vein. Tell your doctor if you are pregnant or plan to become pregnant. It is not known if ferric carboxymaltose will harm an unborn baby, but this medicine may cause severe reactions in the mother that could affect the baby's heartbeat. Having iron deficiency anemia during pregnancy may increase the risk of premature birth or low birth weight. The benefit of treating this condition with ferric carboxymaltose may outweigh any risks to the baby. If you are breastfeeding, tell your doctor if you notice diarrhea or constipation in the nursing baby. How is ferric carboxymaltose given? Ferric carboxymaltose is injected into a vein by a healthcare provider. Tell your medical caregivers if you feel any burning or pain when ferric carboxymaltose is injected. You will be watched for at least 30 minutes to make sure you do not have an allergic reaction.   This medicine is usually given in 2 doses, 7 days apart. You may need frequent medical tests, even if you have no symptoms. What happens if I miss a dose? Call your doctor for instructions if you miss an appointment for your ferric carboxymaltose injection. What happens if I overdose? Seek emergency medical attention or call the Poison Help line at [phone]. Overdose symptoms may include pain, cough, wheezing, shortness of breath, coughing up blood, weight loss, or slowed growth in a child. What should I avoid after receiving ferric carboxymaltose? Do not take iron supplements or a vitamin/mineral supplement that your doctor has not prescribed or recommended. What are the possible side effects of ferric carboxymaltose? Get emergency medical help if you have signs of an allergic reaction: hives; feeling like you might pass out; wheezing, difficult breathing; swelling of your face, lips, tongue, or throat. Call your doctor at once if you have:  increased blood pressure --dizziness, nausea, sudden warmth or redness in your face, severe headache, pounding in your neck or ears;  low levels of phosphorus in your blood --confusion, bone pain, muscle weakness; or  high levels of iron stored in your body --feeling weak or tired, joint pain, finger pain, stomach pain, weight loss, irregular heartbeats, fluttering in your chest.  Common side effects may include:  nausea;  dizziness;  high blood pressure;  flushing (warmth, redness, or tingly feeling); or  low phosphorus levels. This is not a complete list of side effects and others may occur. Call your doctor for medical advice about side effects. You may report side effects to FDA at 5-901-STE-5252. What other drugs will affect ferric carboxymaltose? Other drugs may affect ferric carboxymaltose, including prescription and over-the-counter medicines, vitamins, and herbal products. Tell your doctor about all other medicines you use. Where can I get more information?   Your pharmacist can provide more information about ferric carboxymaltose. Remember, keep this and all other medicines out of the reach of children, never share your medicines with others, and use this medication only for the indication prescribed. Every effort has been made to ensure that the information provided by [address] is accurate, up-to-date, and complete, but no guarantee is made to that effect. Drug information contained herein may be time sensitive. [facility] information has been compiled for use by healthcare practitioners and consumers in the [place] and therefore [facility] does not warrant that uses outside of the [place] are appropriate, unless specifically indicated otherwise. [facility]'s drug information does not endorse drugs, diagnose patients or recommend therapy. [facility]'s drug information is an informational resource designed to assist licensed healthcare practitioners in caring for their patients and/or to serve consumers viewing this service as a supplement to, and not a substitute for, the expertise, skill, knowledge and judgment of healthcare practitioners. The absence of a warning for a given drug or drug combination in no way should be construed to indicate that the drug or drug combination is safe, effective or appropriate for any given patient. [facility] does not assume any responsibility for any aspect of healthcare administered with the aid of information [facility] provides. The information contained herein is not intended to cover all possible uses, directions, precautions, warnings, drug interactions, allergic reactions, or adverse effects. If you have questions about the drugs you are taking, check with your doctor, nurse or pharmacist.  Copyright 6991-6679 [facility] [place]. Version: 3.01. Revision date: 9/24/2021. Care instructions adapted under license by [facility] ([facility]).  If you have questions about a medical condition or this instruction, always ask your healthcare professional. [employer] disclaims any warranty or liability for your use of this information.

## 2023-01-16 NOTE — PROGRESS NOTES
MEDICAL HEMATOLOGY/ONCOLOGY PROGRESS NOTE       Treasure Hatfield   1970 1/17/2023     Chief Complaint   Patient presents with    Follow-up     Other iron deficiency anemia            INTERVAL HISTORY/HISTORY OF PRESENT ILLNESS:    Diagnosis  Iron deficiency anemia, July 2019  Negative upper/lower GI scope August 2019    Treatment summary  Failed p.o. iron replacement/intolerance to p.o. iron replacement  2/28/2020- IV iron replacement with Injectafer 750 mg  5/28/21 & 6/4/21 IV iron replacement with Injectafer 750 mg x2  05/27/22- Venofer 1000mg IV  Sep 2022-Injectafer 1500mg    Hematology history  The patient has been diagnosed with iron deficiency anemia. She presents today for a routine follow-up visit. She received iron therapy in Sep 2022. She is followed by rheumatology in [place]. She denies any overt hematuria, hematochezia. She does take PPIs for GERD symptoms. She had labs done today. No new complaints. She received [facility] Sep 2022. She feels fatigue and low exercise tolerance. She had endoscopy performed with GI. Hematology history  Treasure Hatfield is a 45 y.o.  female referred by LYLE Pavon for new onset iron deficiency anemia. 7/18/19 CBC revealed a WBC of 13.3 with 77% PMN, 14% lymphocytes, 6% monocytes, 2% eosinophils, 1% basophils, Hgb 9.7, MCV 74, MCHC 31.8, ,000.  7/18/19 CMP was WNL. ESR 5 (0-40)  7/25/19 CBC revealed a WBC of 10.6, Hgb 9.3, MCV 77.5, MCHC 30, ,000. She was started on Niferex-150 daily and omeprazole was increased to twice daily. 8/20/19 Follow-up CBC revealed a WBC of 9.9, Hgb 9.9, MCV 76.7, MCHC 30, ,000.   PRIOR CBCS  1/20/2018, WBC 11.9, Hgb 12.5, MCV 87.9, ,000  1/18/2017, WBC 10.4 with normal percent differential, Hgb 12.6, MCV 89.3, ,000  3/28/2017, WBC 10.1, Hgb 13, MCV 90.8, ,000  8/27/19 Endoscopy was performed by Dr. Kobi Cadena was negative except for some diminutive gastric polyps with small bowel biopsies negative for sprue. 8/27/2019- upper endoscopy showed Esophagus: normal; EGJ at 40 cm and normal.There is NO hiatal hernia present. Stomach:  Normal except for a few 3-5 mm sessile hyperplastic appearing polyps in the body likely from her use of acid suppression medications. NO overt atrophic gastritis. Rugae were normal. Lumen distended well with insufflation. Duodenum: normal; random biopsies were taken to check for Celiac disease/villous atrophy  8/28/19 Colonoscopy performed by Dr. Niles Tyler revealed left-sided diverticulosis, grade 1 internal hemorrhoids. Small bowel evaluation has not been performed yet. Occult bloody stools x3- negative  1/3/2020- laboratory studies showed CBC showed WBC 12.4, hemoglobin 11.5/MCV 80, RDW 18, platelet counts 483,528. Iron profile showed iron 44, TIBC 419, U , iron saturation 11%, ferritin 10. CMP showed normal kidney function creatinine 0.8. LFTs normal.  Otherwise unremarkable. 1/7/2020-poor tolerance to p.o. iron with complains of abdominal pain, nausea constipation. Therefore recommend IV iron replacement with Injectafer 750mg x 2 doses. 2/18/2020-WBC 10.3, hemoglobin 13.2/MCV 86, platelets 633,486. Therefore, improvement of her anemia. 6/4/21 Stool occult blood-positive x2,  negative x1  5/28/21 & 6/4/21 IV iron replacement with Injectafer 750 mg x2 doses  07/03/21-Iron 62, TIBC 229, %sat 27, Ferritin 284, WBC12.9, Hb 12.3/MCV82, Platelets 293Z   8/42/2522- Endoscope Capsule- Normal Duodenal,Jejunal and Ileal mucosa. No obvious ulcers or erosion or masses or strictures or strenuous or AVMs or evidence of active or recent bleeding were detected on this study. 10/6/2021 XR Abdomen(single) Large amount of stool in colon. No evidence of obstruction or ileus. 11/09/21-CBC 13.1, Hb 13.1, platelets 706N.   57/89/53-QJE 12.4, Hb 10.9/85, Platelets 206X  3/9/4355-ZMOT Studies [facility]) Iron 33, TIBC 351, Iron Sat 9, Ferritin 13.  Poor oral iron tolerance. 05/27/2022-Venofer 1000mg IV.  06/21/2022- WBC 12.25, Hb 12.5 ( previously 10.9), Platelets 255H  1/77/0589 Iron Studies [facility]): Iron 62, Tibc 332, Iron Sat 19, Ferritin 136  9/13/2022-creatinine 1.05, LFTs normal, ferritin 16, iron 48, iron saturation 14%, TIBC 340, U .   Hemoglobin 11.2  09/29/22- Completion Injectafer 1500mg  01/17/23-WBC 11, Hb 12.8, Platelets 448J        PAST MEDICAL HISTORY:   Past Medical History:   Diagnosis Date    Anemia     Arthritis     RA    Fibromyalgia     GERD (gastroesophageal reflux disease)     Hypothyroidism     hypothyroidism    Iron deficiency anemia 1/6/2020    Lupus ([facility] [address]     Sjogren's disease ([facility] [address]           PAST SURGICAL HISTORY:  Past Surgical History:   Procedure Laterality Date    CHOLECYSTECTOMY      COLONOSCOPY N/A 8/27/2019    Dr Slaughter Filippo to 8/28/19 due to prep    COLONOSCOPY N/A 8/28/2019    Dr Vitale [place] hemorrhoids-Grade 1, suboptimal prep, diverticulosis, 5 yr recall    OVARY REMOVAL Right     UPPER GASTROINTESTINAL ENDOSCOPY N/A 8/27/2019    Dr Aly Morales appearing gastric polyps        SOCIAL HISTORY:  Social History     Socioeconomic History    Marital status:    Tobacco Use    Smoking status: Never    Smokeless tobacco: Never   Vaping Use    Vaping Use: Never used   Substance and Sexual Activity    Alcohol use: Never    Drug use: Never       FAMILY HISTORY:  Family History   Problem Relation Age of Onset    Colon Cancer Neg Hx     Colon Polyps Neg Hx     Esophageal Cancer Neg Hx     Liver Cancer Neg Hx     Liver Disease Neg Hx     Rectal Cancer Neg Hx     Stomach Cancer Neg Hx         Current Outpatient Medications   Medication Sig Dispense Refill    hydroCHLOROthiazide (MICROZIDE) 12.5 MG capsule Take 12.5 mg by mouth every morning       DULoxetine (CYMBALTA) 60 MG extended release capsule Take 60 mg by mouth every morning      Coenzyme Q10 (CO Q10) 100 MG CAPS Take by mouth daily      ondansetron (ZOFRAN) 4 MG tablet Take 1 tablet by mouth every 8 hours as needed for Nausea or Vomiting 3 tablet 0    Ascorbic Acid (VITAMIN C) 1000 MG tablet Take 1,000 mg by mouth daily      calcium acetate-magnesium carb 450-200 MG TABS Take by mouth 2 times daily      omeprazole (PRILOSEC) 20 MG delayed release capsule Take 20 mg by mouth daily      predniSONE (DELTASONE) 10 MG tablet Take 10 mg by mouth daily      gabapentin (NEURONTIN) 300 MG capsule Take 300 mg by mouth 3 times daily. levothyroxine (SYNTHROID) 150 MCG tablet Take 150 mcg by mouth Daily       folic acid (FOLVITE) 1 MG tablet Take 1 mg by mouth daily      DULoxetine (CYMBALTA) 30 MG extended release capsule Take 30 mg by mouth nightly       hydroxychloroquine (PLAQUENIL) 200 MG tablet Take by mouth 2 times daily        No current facility-administered medications for this visit.       REVIEW OF SYSTEMS:   CONSTITUTIONAL: no fever, no night sweats, fatigue;  HEENT: no blurring of vision, no double vision, no hearing difficulty, no tinnitus, no ulceration, no dysplasia, no epistaxis;   LUNGS: no cough, no hemoptysis, no wheeze,  no shortness of breath;  CARDIOVASCULAR: no palpitation, no chest pain, no shortness of breath;  GI: no abdominal pain, no nausea, no vomiting, no diarrhea, no constipation;  JEREMY: no dysuria, no hematuria, no frequency or urgency, no nephrolithiasis;  MUSCULOSKELETAL: no joint pain, no swelling, no stiffness;  ENDOCRINE: no polyuria, no polydipsia, no cold or heat intolerance;  HEMATOLOGY: no easy bruising or bleeding, no history of clotting disorder;  DERMATOLOGY: no skin rash, no eczema, no pruritus;  PSYCHIATRY: no depression, no anxiety, no panic attacks, no suicidal ideation, no homicidal ideation;  NEUROLOGY: no syncope, no seizures, no numbness or tingling of hands, no numbness or tingling of feet, no paresis;    Vitals signs:  BP (!) 152/80 (Site: Right Upper Arm, Position: Sitting, Cuff Size: Medium Adult)   Pulse 93 Ht 5' 5\" (1.651 m)   Wt 247 lb (112 kg)   SpO2 97%   BMI 41.10 kg/m²    Pain scale:  Pain Score:   0 - No pain   PHYSICAL EXAM:  CONSTITUTIONAL: Alert, appropriate, no acute distress  EYES: Non icteric, EOM intact, pupils equal round   ENT: Mucus membranes moist, no oral pharyngeal lesions, external inspection of ears and nose are normal.  NECK: Supple, no masses. No palpable thyroid mass  CHEST/LUNGS: CTA bilaterally, normal respiratory effort   CARDIOVASCULAR: RRR, no murmurs. No lower extremity edema  ABDOMEN: soft non-tender, active bowel sounds, no HSM. No palpable masses  EXTREMITIES: Erythromelalgia, warm, full ROM in all 4 extremities, no focal weakness. SKIN: warm, dry with no rashes or lesions  LYMPH: No cervical, clavicular, axillary, or inguinal lymphadenopathy  NEUROLOGIC: follows commands, non focal   PSYCH: mood and affect appropriate. Alert and oriented to time, place, person    Relevant Lab findings/reviewed by me:  09/29/22- Completion Injectafer 1500mg    01/17/23  WBC 11  Hb 12.8  Platelets 643H  CMP WNL except for BUN 21  Iron 360  TIBC 454  Iron sat 79%  Ferritin 61    Relevant Imaging studies/reviewed by me:  None    ASSESSMENT:    Orders Placed This Encounter   Procedures    CBC WITH DIFFERENTIAL/PLATELET     Standing Status:   Future     Standing Expiration Date:   1/17/2024    Comprehensive Metabolic Panel     Standing Status:   Future     Standing Expiration Date:   1/17/2024    Iron and TIBC     Standing Status:   Future     Standing Expiration Date:   1/17/2024    Ferritin     Standing Status:   Future     Standing Expiration Date:   1/17/2024        Evan Shipman was seen today for follow-up. Diagnoses and all orders for this visit:    Other iron deficiency anemia  -     CBC WITH DIFFERENTIAL/PLATELET; Future  -     Comprehensive Metabolic Panel; Future  -     Iron and TIBC; Future  -     Ferritin;  Future    Care plan discussed with patient    #Iron deficiency anemia- iron profile consistent with iron deficiency anemia. Upper and lower endoscopy was unrevealing. She did  have a capsule endoscopy performed that was also unremarkable. Occult blood x3 positive in 2020. Patient received IV iron therapy last year. She had very poor tolerance to PO iron replacement. She received IV iron replacement with Injectafer 750 mg x 2 dose February 2020. Iron profile now with severe iron deficiency. 5/28/21 & 6/4/21 IV iron replacement with Injectafer 750 mg x2 doses  07/03/21-Iron 62, TIBC 229, %sat 27, Ferritin 284, WBC12.9, Hb 12.3/MCV82, Platelets 748P   Unremarkable capsule endoscopy:. NormalDuodenal,JejunalandIlealmucosa. NO obvious ulcers or erosions or masses or strictures or stenosis  or AVMs or evidence of active or recent bleeding were detected on this study. CBC today showed normal hemoglobin 10.9, Ferritin 13, iron sat 9%  -05/27/22- Recommended Venofer 500mg IV x 2 doses  -Hb 12.5, Platelets 985R with significant improvement of Hb  -09/13/22- WBC 10.1, Hb 11.2, Plts 354K    9/13/2022-creatinine 1.05, LFTs normal, ferritin 16, iron 48, iron saturation 14%, TIBC 340, U . Hemoglobin 11.2  09/29/22- Completion Injectafer 1500mg  01/17/23-WBC 11, Hb 12.8, Platelets 082M    Oral iron intolerance    Hypertension-BP (!) 152/80 (Site: Right Upper Arm, Position: Sitting, Cuff Size: Medium Adult)   Pulse 93   Ht 5' 5\" (1.651 m)   Wt 247 lb (112 kg)   SpO2 97%   BMI 41.10 kg/m²      COVID-19-patient has not received COVID 19 vaccination yet. SLE  -Plaquenil   -Prednisone    Neuropathy  -Cymbalta  -Gabapentin    Plan:  RTC with MD in 3 months  Continue follow-up with PCP   CBC, iron studies, ferritin today and in 3 months-prior to next visit-Not had completed  Continue aspirin 81 mg p.o. daily  Continue Hydrea 1000 mg p.o. twice daily      Follow Up:      No follow-ups on file.    Data Amanda Brady am pre charting  as Medical Assistant for Ad Leblanc, MD. Electronically signed by Elmer Mahan MA on 1/17/2023 at 9:09 AM CST. Daniela Dooley am scribing for Aleksander Brantley MD. Electronically signed by Reji Ruiz RN on 1/17/2023 at 11:00 AM CST. I, Dr Lexi Reynolds, personally performed the services described in this documentation as scribed by Reji Ruiz RN in my presence and is both accurate and complete. I have seen, examined and reviewed this patient medication list, appropriate labs and imaging studies. I reviewed relevant medical records and others physicians notes. I discussed the plans of care with the patient. I answered all the questions to the patients satisfaction. I have also reviewed the chief complaint (CC) and part of the history (History of Present Illness (HPI), Past Family Social History [facility]), or Review of Systems (ROS) and made changes when appropriated. (Please note that portions of this note were completed with a voice recognition program. Efforts were made to edit the dictations but occasionally words are mis-transcribed. )Electronically signed by Aleksander Brantley MD on 1/17/2023 at 11:01 AM

## 2023-01-17 ENCOUNTER — TELEPHONE (OUTPATIENT)
Dept: INFUSION THERAPY | Age: 53
End: 2023-01-17

## 2023-01-17 ENCOUNTER — OFFICE VISIT (OUTPATIENT)
Dept: HEMATOLOGY | Age: 53
End: 2023-01-17
Payer: COMMERCIAL

## 2023-01-17 VITALS
HEIGHT: 65 IN | WEIGHT: 247 LBS | SYSTOLIC BLOOD PRESSURE: 152 MMHG | BODY MASS INDEX: 41.15 KG/M2 | OXYGEN SATURATION: 97 % | HEART RATE: 93 BPM | DIASTOLIC BLOOD PRESSURE: 80 MMHG

## 2023-01-17 DIAGNOSIS — D50.8 OTHER IRON DEFICIENCY ANEMIA: Primary | ICD-10-CM

## 2023-01-17 DIAGNOSIS — Z71.89 CARE PLAN DISCUSSED WITH PATIENT: ICD-10-CM

## 2023-01-17 PROCEDURE — 3077F SYST BP >= 140 MM HG: CPT | Performed by: INTERNAL MEDICINE

## 2023-01-17 PROCEDURE — 3079F DIAST BP 80-89 MM HG: CPT | Performed by: INTERNAL MEDICINE

## 2023-01-17 PROCEDURE — 99214 OFFICE O/P EST MOD 30 MIN: CPT | Performed by: INTERNAL MEDICINE

## 2023-01-17 NOTE — TELEPHONE ENCOUNTER
Called and discussed patients iron study labs with patient. Patients labs WML at this time and is not in need of Iron transfusions at this time. Patient had no further questions.  Electronically signed by Yaritza Mcguire RN on 1/17/2023 at 5:39 PM

## 2023-07-18 ENCOUNTER — TELEPHONE (OUTPATIENT)
Dept: HEMATOLOGY | Age: 53
End: 2023-07-18

## 2023-07-18 ENCOUNTER — OFFICE VISIT (OUTPATIENT)
Dept: HEMATOLOGY | Age: 53
End: 2023-07-18
Payer: COMMERCIAL

## 2023-07-18 VITALS
BODY MASS INDEX: 43.32 KG/M2 | OXYGEN SATURATION: 97 % | HEIGHT: 65 IN | DIASTOLIC BLOOD PRESSURE: 84 MMHG | SYSTOLIC BLOOD PRESSURE: 160 MMHG | WEIGHT: 260 LBS | HEART RATE: 103 BPM

## 2023-07-18 DIAGNOSIS — Z71.89 CARE PLAN DISCUSSED WITH PATIENT: ICD-10-CM

## 2023-07-18 DIAGNOSIS — D50.8 OTHER IRON DEFICIENCY ANEMIA: Primary | ICD-10-CM

## 2023-07-18 PROCEDURE — 3079F DIAST BP 80-89 MM HG: CPT | Performed by: INTERNAL MEDICINE

## 2023-07-18 PROCEDURE — 99213 OFFICE O/P EST LOW 20 MIN: CPT | Performed by: INTERNAL MEDICINE

## 2023-07-18 PROCEDURE — 3077F SYST BP >= 140 MM HG: CPT | Performed by: INTERNAL MEDICINE

## 2023-07-18 NOTE — TELEPHONE ENCOUNTER
Called and informed patient that Venofer plan created and that once the insurance approves medication, she will receive a call to get her scheduled. Patient verbalizes understanding.  Electronically signed by Favian Cox RN on 7/18/2023 at 11:20 AM

## 2023-07-18 NOTE — PROGRESS NOTES
New infusion for Venofer built for patient at this time.  Electronically signed by Radha White RN on 7/18/2023 at 11:04 AM

## 2023-07-25 DIAGNOSIS — D50.8 OTHER IRON DEFICIENCY ANEMIA: Primary | ICD-10-CM

## 2023-07-25 RX ORDER — SODIUM CHLORIDE 9 MG/ML
5-250 INJECTION, SOLUTION INTRAVENOUS ONCE
OUTPATIENT
Start: 2023-07-25 | End: 2023-07-25

## 2023-07-25 RX ORDER — ACETAMINOPHEN 325 MG/1
650 TABLET ORAL
OUTPATIENT
Start: 2023-07-25

## 2023-07-25 RX ORDER — HEPARIN 100 UNIT/ML
500 SYRINGE INTRAVENOUS PRN
OUTPATIENT
Start: 2023-07-25

## 2023-07-25 RX ORDER — SODIUM CHLORIDE 9 MG/ML
INJECTION, SOLUTION INTRAVENOUS CONTINUOUS
OUTPATIENT
Start: 2023-07-25

## 2023-07-25 RX ORDER — DIPHENHYDRAMINE HYDROCHLORIDE 50 MG/ML
50 INJECTION INTRAMUSCULAR; INTRAVENOUS
OUTPATIENT
Start: 2023-07-25

## 2023-07-25 RX ORDER — ALBUTEROL SULFATE 90 UG/1
4 AEROSOL, METERED RESPIRATORY (INHALATION) PRN
OUTPATIENT
Start: 2023-07-25

## 2023-07-25 RX ORDER — EPINEPHRINE 1 MG/ML
0.3 INJECTION, SOLUTION, CONCENTRATE INTRAVENOUS PRN
OUTPATIENT
Start: 2023-07-25

## 2023-07-25 RX ORDER — ONDANSETRON 2 MG/ML
8 INJECTION INTRAMUSCULAR; INTRAVENOUS
OUTPATIENT
Start: 2023-07-25

## 2023-07-25 RX ORDER — SODIUM CHLORIDE 0.9 % (FLUSH) 0.9 %
5-40 SYRINGE (ML) INJECTION PRN
OUTPATIENT
Start: 2023-07-25

## 2023-07-25 NOTE — PROGRESS NOTES
Per MD, therapy plan for Injectafer 750mg, weekly for two doses created.   Confirmed authorization of Injectafer with Karlene Hawthorne PA team. Patient scheduled for Aug 1 and Aug 8 at 1pm. Electronically signed by Indra Figueroa RN on 7/25/2023 at 10:09 AM

## 2023-08-01 ENCOUNTER — HOSPITAL ENCOUNTER (OUTPATIENT)
Dept: INFUSION THERAPY | Age: 53
Setting detail: INFUSION SERIES
Discharge: HOME | End: 2023-08-01
Payer: COMMERCIAL

## 2023-08-01 VITALS
HEART RATE: 98 BPM | SYSTOLIC BLOOD PRESSURE: 108 MMHG | TEMPERATURE: 97.8 F | OXYGEN SATURATION: 95 % | RESPIRATION RATE: 18 BRPM | DIASTOLIC BLOOD PRESSURE: 62 MMHG

## 2023-08-01 DIAGNOSIS — D50.8 OTHER IRON DEFICIENCY ANEMIA: Primary | ICD-10-CM

## 2023-08-01 PROCEDURE — 2580000003 HC RX 258: Performed by: INTERNAL MEDICINE

## 2023-08-01 PROCEDURE — 96365 THER/PROPH/DIAG IV INF INIT: CPT

## 2023-08-01 PROCEDURE — 6360000002 HC RX W HCPCS: Performed by: INTERNAL MEDICINE

## 2023-08-01 RX ORDER — SODIUM CHLORIDE 9 MG/ML
5-250 INJECTION, SOLUTION INTRAVENOUS ONCE
Status: COMPLETED | OUTPATIENT
Start: 2023-08-01 | End: 2023-08-01

## 2023-08-01 RX ORDER — EPINEPHRINE 1 MG/ML
0.3 INJECTION, SOLUTION, CONCENTRATE INTRAVENOUS PRN
OUTPATIENT
Start: 2023-08-08

## 2023-08-01 RX ORDER — ACETAMINOPHEN 325 MG/1
650 TABLET ORAL
OUTPATIENT
Start: 2023-08-08

## 2023-08-01 RX ORDER — ONDANSETRON 2 MG/ML
8 INJECTION INTRAMUSCULAR; INTRAVENOUS
OUTPATIENT
Start: 2023-08-08

## 2023-08-01 RX ORDER — ALBUTEROL SULFATE 90 UG/1
4 AEROSOL, METERED RESPIRATORY (INHALATION) PRN
OUTPATIENT
Start: 2023-08-08

## 2023-08-01 RX ORDER — HEPARIN 100 UNIT/ML
500 SYRINGE INTRAVENOUS PRN
OUTPATIENT
Start: 2023-08-08

## 2023-08-01 RX ORDER — SODIUM CHLORIDE 0.9 % (FLUSH) 0.9 %
5-40 SYRINGE (ML) INJECTION PRN
Status: DISCONTINUED | OUTPATIENT
Start: 2023-08-01 | End: 2023-08-02 | Stop reason: HOSPADM

## 2023-08-01 RX ORDER — DIPHENHYDRAMINE HYDROCHLORIDE 50 MG/ML
50 INJECTION INTRAMUSCULAR; INTRAVENOUS
OUTPATIENT
Start: 2023-08-08

## 2023-08-01 RX ORDER — SODIUM CHLORIDE 0.9 % (FLUSH) 0.9 %
5-40 SYRINGE (ML) INJECTION PRN
Status: CANCELLED | OUTPATIENT
Start: 2023-08-08

## 2023-08-01 RX ORDER — SODIUM CHLORIDE 9 MG/ML
5-250 INJECTION, SOLUTION INTRAVENOUS ONCE
Status: CANCELLED | OUTPATIENT
Start: 2023-08-08 | End: 2023-08-08

## 2023-08-01 RX ORDER — SODIUM CHLORIDE 9 MG/ML
INJECTION, SOLUTION INTRAVENOUS CONTINUOUS
OUTPATIENT
Start: 2023-08-08

## 2023-08-01 RX ADMIN — FERRIC CARBOXYMALTOSE INJECTION 750 MG: 50 INJECTION, SOLUTION INTRAVENOUS at 13:59

## 2023-08-01 RX ADMIN — SODIUM CHLORIDE 20 ML/HR: 9 INJECTION, SOLUTION INTRAVENOUS at 13:57

## 2023-08-08 ENCOUNTER — HOSPITAL ENCOUNTER (OUTPATIENT)
Dept: INFUSION THERAPY | Age: 53
Setting detail: INFUSION SERIES
Discharge: HOME | End: 2023-08-08
Payer: COMMERCIAL

## 2023-08-08 VITALS
BODY MASS INDEX: 44.1 KG/M2 | DIASTOLIC BLOOD PRESSURE: 71 MMHG | RESPIRATION RATE: 18 BRPM | WEIGHT: 265 LBS | OXYGEN SATURATION: 96 % | SYSTOLIC BLOOD PRESSURE: 117 MMHG | HEART RATE: 93 BPM | TEMPERATURE: 98.6 F

## 2023-08-08 DIAGNOSIS — D50.8 OTHER IRON DEFICIENCY ANEMIA: Primary | ICD-10-CM

## 2023-08-08 PROCEDURE — 6360000002 HC RX W HCPCS: Performed by: INTERNAL MEDICINE

## 2023-08-08 PROCEDURE — 2580000003 HC RX 258: Performed by: INTERNAL MEDICINE

## 2023-08-08 PROCEDURE — 96365 THER/PROPH/DIAG IV INF INIT: CPT

## 2023-08-08 RX ORDER — SODIUM CHLORIDE 9 MG/ML
5-250 INJECTION, SOLUTION INTRAVENOUS ONCE
Status: COMPLETED | OUTPATIENT
Start: 2023-08-08 | End: 2023-08-08

## 2023-08-08 RX ORDER — SODIUM CHLORIDE 9 MG/ML
INJECTION, SOLUTION INTRAVENOUS CONTINUOUS
Status: CANCELLED | OUTPATIENT
Start: 2023-08-08

## 2023-08-08 RX ORDER — ONDANSETRON 2 MG/ML
8 INJECTION INTRAMUSCULAR; INTRAVENOUS
Status: CANCELLED | OUTPATIENT
Start: 2023-08-08

## 2023-08-08 RX ORDER — HEPARIN 100 UNIT/ML
500 SYRINGE INTRAVENOUS PRN
Status: CANCELLED | OUTPATIENT
Start: 2023-08-08

## 2023-08-08 RX ORDER — ALBUTEROL SULFATE 90 UG/1
4 AEROSOL, METERED RESPIRATORY (INHALATION) PRN
Status: CANCELLED | OUTPATIENT
Start: 2023-08-08

## 2023-08-08 RX ORDER — SODIUM CHLORIDE 0.9 % (FLUSH) 0.9 %
5-40 SYRINGE (ML) INJECTION PRN
Status: CANCELLED | OUTPATIENT
Start: 2023-08-08

## 2023-08-08 RX ORDER — ACETAMINOPHEN 325 MG/1
650 TABLET ORAL
Status: CANCELLED | OUTPATIENT
Start: 2023-08-08

## 2023-08-08 RX ORDER — DIPHENHYDRAMINE HYDROCHLORIDE 50 MG/ML
50 INJECTION INTRAMUSCULAR; INTRAVENOUS
Status: CANCELLED | OUTPATIENT
Start: 2023-08-08

## 2023-08-08 RX ORDER — EPINEPHRINE 1 MG/ML
0.3 INJECTION, SOLUTION, CONCENTRATE INTRAVENOUS PRN
Status: CANCELLED | OUTPATIENT
Start: 2023-08-08

## 2023-08-08 RX ORDER — SODIUM CHLORIDE 0.9 % (FLUSH) 0.9 %
5-40 SYRINGE (ML) INJECTION PRN
Status: DISCONTINUED | OUTPATIENT
Start: 2023-08-08 | End: 2023-08-09 | Stop reason: HOSPADM

## 2023-08-08 RX ORDER — SODIUM CHLORIDE 9 MG/ML
5-250 INJECTION, SOLUTION INTRAVENOUS ONCE
Status: CANCELLED | OUTPATIENT
Start: 2023-08-08 | End: 2023-08-08

## 2023-08-08 RX ADMIN — FERRIC CARBOXYMALTOSE INJECTION 750 MG: 50 INJECTION, SOLUTION INTRAVENOUS at 13:38

## 2023-08-08 RX ADMIN — SODIUM CHLORIDE 20 ML/HR: 9 INJECTION, SOLUTION INTRAVENOUS at 13:37

## 2023-08-08 NOTE — DISCHARGE INSTRUCTIONS
ferric carboxymaltose  Pronunciation:  AGUILAR ik lori BOX ee JOSEPHL julita  Brand:  Injectafer  What is the most important information I should know about ferric carboxymaltose? Use only as directed. Tell your doctor if you use other medicines or have other medical conditions or allergies. What is ferric carboxymaltose? Ferric carboxymaltose is an iron replacement product that is used in adults used to treat iron deficiency anemia (GEN), which is low red blood cells caused by a lack of iron in the body. Ferric carboxymaltose is given to adults with GEN and chronic kidney disease (not on dialysis), or to adults with GEN when iron taken by mouth is not effective. Ferric carboxymaltose may also be used for purposes not listed in this medication guide. What should I discuss with my healthcare provider before receiving ferric carboxymaltose? You should not use ferric carboxymaltose if you are allergic to it. Tell your doctor if you have ever had:  high blood pressure; or  an allergic reaction to iron injected into a vein. Tell your doctor if you are pregnant or plan to become pregnant. It is not known if ferric carboxymaltose will harm an unborn baby, but this medicine may cause severe reactions in the mother that could affect the baby's heartbeat. Having iron deficiency anemia during pregnancy may increase the risk of premature birth or low birth weight. The benefit of treating this condition with ferric carboxymaltose may outweigh any risks to the baby. If you are breastfeeding, tell your doctor if you notice diarrhea or constipation in the nursing baby. How is ferric carboxymaltose given? Ferric carboxymaltose is injected into a vein by a healthcare provider. Tell your medical caregivers if you feel any burning or pain when ferric carboxymaltose is injected. You will be watched for at least 30 minutes to make sure you do not have an allergic reaction.   This medicine is usually given in 2 doses, 7 days

## 2023-11-11 ENCOUNTER — HOSPITAL ENCOUNTER (EMERGENCY)
Facility: HOSPITAL | Age: 53
Discharge: HOME | End: 2023-11-12
Attending: EMERGENCY MEDICINE | Admitting: EMERGENCY MEDICINE
Payer: COMMERCIAL

## 2023-11-11 ENCOUNTER — APPOINTMENT (OUTPATIENT)
Dept: CT IMAGING | Facility: HOSPITAL | Age: 53
End: 2023-11-11
Payer: COMMERCIAL

## 2023-11-11 DIAGNOSIS — R31.9 HEMATURIA, UNSPECIFIED TYPE: ICD-10-CM

## 2023-11-11 DIAGNOSIS — R30.0 DYSURIA: ICD-10-CM

## 2023-11-11 DIAGNOSIS — S30.1XXA CONTUSION OF ABDOMINAL WALL, INITIAL ENCOUNTER: ICD-10-CM
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
## 2023-11-11 DIAGNOSIS — S20.211A CONTUSION OF RIGHT CHEST WALL, INITIAL ENCOUNTER: ICD-10-CM

## 2023-11-11 LAB
ALBUMIN SERPL-MCNC: 4 G/DL (ref 3.5–5.2)
ALBUMIN/GLOB SERPL: 1.5 G/DL
ALP SERPL-CCNC: 113 U/L (ref 39–117)
ALT SERPL W P-5'-P-CCNC: 98 U/L (ref 1–33)
ANION GAP SERPL CALCULATED.3IONS-SCNC: 10 MMOL/L (ref 5–15)
AST SERPL-CCNC: 62 U/L (ref 1–32)
BACTERIA UR QL AUTO: ABNORMAL /HPF
BASOPHILS # BLD AUTO: 0.06 10*3/MM3 (ref 0–0.2)
BASOPHILS NFR BLD AUTO: 0.4 % (ref 0–1.5)
BILIRUB SERPL-MCNC: 0.3 MG/DL (ref 0–1.2)
BILIRUB UR QL STRIP: NEGATIVE
BUN SERPL-MCNC: 19 MG/DL (ref 6–20)
BUN/CREAT SERPL: 22.4 (ref 7–25)
CALCIUM SPEC-SCNC: 9.3 MG/DL (ref 8.6–10.5)
CHLORIDE SERPL-SCNC: 101 MMOL/L (ref 98–107)
CLARITY UR: ABNORMAL
CO2 SERPL-SCNC: 29 MMOL/L (ref 22–29)
COLOR UR: ABNORMAL
CREAT SERPL-MCNC: 0.85 MG/DL (ref 0.57–1)
DEPRECATED RDW RBC AUTO: 49.5 FL (ref 37–54)
EGFRCR SERPLBLD CKD-EPI 2021: 81 ML/MIN/1.73
EOSINOPHIL # BLD AUTO: 0.19 10*3/MM3 (ref 0–0.4)
EOSINOPHIL NFR BLD AUTO: 1.1 % (ref 0.3–6.2)
ERYTHROCYTE [DISTWIDTH] IN BLOOD BY AUTOMATED COUNT: 15.1 % (ref 12.3–15.4)
GLOBULIN UR ELPH-MCNC: 2.6 GM/DL
GLUCOSE SERPL-MCNC: 127 MG/DL (ref 65–99)
GLUCOSE UR STRIP-MCNC: NEGATIVE MG/DL
HCT VFR BLD AUTO: 39.9 % (ref 34–46.6)
HGB BLD-MCNC: 12.9 G/DL (ref 12–15.9)
HGB UR QL STRIP.AUTO: ABNORMAL
HYALINE CASTS UR QL AUTO: ABNORMAL /LPF
IMM GRANULOCYTES # BLD AUTO: 0.07 10*3/MM3 (ref 0–0.05)
IMM GRANULOCYTES NFR BLD AUTO: 0.4 % (ref 0–0.5)
KETONES UR QL STRIP: ABNORMAL
LEUKOCYTE ESTERASE UR QL STRIP.AUTO: ABNORMAL
LYMPHOCYTES # BLD AUTO: 1.68 10*3/MM3 (ref 0.7–3.1)
LYMPHOCYTES NFR BLD AUTO: 10.1 % (ref 19.6–45.3)
MCH RBC QN AUTO: 28.9 PG (ref 26.6–33)
MCHC RBC AUTO-ENTMCNC: 32.3 G/DL (ref 31.5–35.7)
MCV RBC AUTO: 89.3 FL (ref 79–97)
MONOCYTES # BLD AUTO: 1.26 10*3/MM3 (ref 0.1–0.9)
MONOCYTES NFR BLD AUTO: 7.5 % (ref 5–12)
NEUTROPHILS NFR BLD AUTO: 13.44 10*3/MM3 (ref 1.7–7)
NEUTROPHILS NFR BLD AUTO: 80.5 % (ref 42.7–76)
NITRITE UR QL STRIP: NEGATIVE
NRBC BLD AUTO-RTO: 0 /100 WBC (ref 0–0.2)
PH UR STRIP.AUTO: 5.5 [PH] (ref 5–8)
PLATELET # BLD AUTO: 258 10*3/MM3 (ref 140–450)
PMV BLD AUTO: 11.6 FL (ref 6–12)
POTASSIUM SERPL-SCNC: 4.7 MMOL/L (ref 3.5–5.2)
PROT SERPL-MCNC: 6.6 G/DL (ref 6–8.5)
PROT UR QL STRIP: ABNORMAL
RBC # BLD AUTO: 4.47 10*6/MM3 (ref 3.77–5.28)
RBC # UR STRIP: ABNORMAL /HPF
REF LAB TEST METHOD: ABNORMAL
SODIUM SERPL-SCNC: 140 MMOL/L (ref 136–145)
SP GR UR STRIP: 1.03 (ref 1–1.03)
SQUAMOUS #/AREA URNS HPF: ABNORMAL /HPF
UROBILINOGEN UR QL STRIP: ABNORMAL
WBC # UR STRIP: ABNORMAL /HPF
WBC NRBC COR # BLD: 16.7 10*3/MM3 (ref 3.4–10.8)

## 2023-11-11 PROCEDURE — 80053 COMPREHEN METABOLIC PANEL: CPT | Performed by: EMERGENCY MEDICINE

## 2023-11-11 PROCEDURE — 87077 CULTURE AEROBIC IDENTIFY: CPT | Performed by: EMERGENCY MEDICINE

## 2023-11-11 PROCEDURE — 87186 SC STD MICRODIL/AGAR DIL: CPT | Performed by: EMERGENCY MEDICINE

## 2023-11-11 PROCEDURE — 99284 EMERGENCY DEPT VISIT MOD MDM: CPT

## 2023-11-11 PROCEDURE — 81001 URINALYSIS AUTO W/SCOPE: CPT | Performed by: EMERGENCY MEDICINE

## 2023-11-11 PROCEDURE — 71250 CT THORAX DX C-: CPT

## 2023-11-11 PROCEDURE — 25010000002 ONDANSETRON PER 1 MG: Performed by: EMERGENCY MEDICINE

## 2023-11-11 PROCEDURE — 96375 TX/PRO/DX INJ NEW DRUG ADDON: CPT

## 2023-11-11 PROCEDURE — 85025 COMPLETE CBC W/AUTO DIFF WBC: CPT | Performed by: EMERGENCY MEDICINE

## 2023-11-11 PROCEDURE — P9612 CATHETERIZE FOR URINE SPEC: HCPCS

## 2023-11-11 PROCEDURE — 25010000002 MORPHINE PER 10 MG: Performed by: EMERGENCY MEDICINE

## 2023-11-11 PROCEDURE — 74176 CT ABD & PELVIS W/O CONTRAST: CPT

## 2023-11-11 PROCEDURE — 87086 URINE CULTURE/COLONY COUNT: CPT | Performed by: EMERGENCY MEDICINE

## 2023-11-11 RX ORDER — SODIUM CHLORIDE 0.9 % (FLUSH) 0.9 %
10 SYRINGE (ML) INJECTION AS NEEDED
Status: DISCONTINUED | OUTPATIENT
Start: 2023-11-11 | End: 2023-11-12 | Stop reason: HOSPADM

## 2023-11-11 RX ORDER — ONDANSETRON 2 MG/ML
4 INJECTION INTRAMUSCULAR; INTRAVENOUS ONCE
Status: COMPLETED | OUTPATIENT
Start: 2023-11-11 | End: 2023-11-11

## 2023-11-11 RX ADMIN — MORPHINE SULFATE 4 MG: 4 INJECTION, SOLUTION INTRAMUSCULAR; INTRAVENOUS at 22:41

## 2023-11-11 RX ADMIN — ONDANSETRON 4 MG: 2 INJECTION INTRAMUSCULAR; INTRAVENOUS at 22:41

## 2023-11-12 VITALS
HEART RATE: 98 BPM | BODY MASS INDEX: 42.49 KG/M2 | DIASTOLIC BLOOD PRESSURE: 83 MMHG | SYSTOLIC BLOOD PRESSURE: 133 MMHG | OXYGEN SATURATION: 97 % | HEIGHT: 65 IN | TEMPERATURE: 97.8 F | RESPIRATION RATE: 18 BRPM | WEIGHT: 255 LBS

## 2023-11-12 PROCEDURE — 96365 THER/PROPH/DIAG IV INF INIT: CPT

## 2023-11-12 PROCEDURE — 25010000002 MORPHINE PER 10 MG: Performed by: EMERGENCY MEDICINE

## 2023-11-12 PROCEDURE — 96376 TX/PRO/DX INJ SAME DRUG ADON: CPT

## 2023-11-12 PROCEDURE — 25010000002 CEFTRIAXONE PER 250 MG: Performed by: EMERGENCY MEDICINE

## 2023-11-12 RX ORDER — CEFDINIR 300 MG/1
300 CAPSULE ORAL 2 TIMES DAILY
Qty: 14 CAPSULE | Refills: 0 | Status: SHIPPED | OUTPATIENT
Start: 2023-11-12

## 2023-11-12 RX ORDER — PHENAZOPYRIDINE HYDROCHLORIDE 200 MG/1
200 TABLET, FILM COATED ORAL ONCE
Status: COMPLETED | OUTPATIENT
Start: 2023-11-12 | End: 2023-11-12

## 2023-11-12 RX ORDER — PHENAZOPYRIDINE HYDROCHLORIDE 200 MG/1
200 TABLET, FILM COATED ORAL 3 TIMES DAILY PRN
Qty: 15 TABLET | Refills: 0 | Status: SHIPPED | OUTPATIENT
Start: 2023-11-12

## 2023-11-12 RX ADMIN — MORPHINE SULFATE 4 MG: 4 INJECTION, SOLUTION INTRAMUSCULAR; INTRAVENOUS at 00:34

## 2023-11-12 RX ADMIN — CEFTRIAXONE 1000 MG: 1 INJECTION, POWDER, FOR SOLUTION INTRAMUSCULAR; INTRAVENOUS at 00:17

## 2023-11-12 RX ADMIN — PHENAZOPYRIDINE HYDROCHLORIDE 200 MG: 200 TABLET ORAL at 00:34

## 2023-11-12 NOTE — ED PROVIDER NOTES
Subjective   History of Present Illness  Patient says she was walking on the street trying to go find a place to use the bathroom this afternoon at outside town and tripped over a speed bump and fell to her right side.  She says she try to catch herself with her right hand and that is a little sore and she did bump her head but has no significant headache or problems there.  Initially she just felt bruised and was fine.  She got up and went to the bathroom with no problems.  However a couple hours later she began hurting along the right side of her chest and began having severe pressure and pain like she needed to go to the bathroom in her suprapubic area.  This soreness has continued since then but her biggest problem is the dysuria and some blood in her urine and pain there.  She feels like she has to go all the time.  She came in to be checked out.    History provided by:  Patient   used: No    Fall  Mechanism of injury: fall    Injury location:  Torso  Torso injury location:  R chest and abdomen  Incident location:  Street  Time since incident:  6 hours  Arrived directly from scene: no    Fall:     Fall occurred:  Tripped    Impact surface:  [place]    Point of impact:  Unable to specify    Entrapped after fall: no    Protective equipment: none    Suspicion of alcohol use: no    Suspicion of drug use: no    Tetanus status:  Unknown  Prior to arrival data:     Bystander interventions:  None    Patient ambulatory at scene: yes      Blood loss:  None    Responsiveness at scene:  Alert    Orientation at scene:  Person, place, situation and time    Loss of consciousness: no      Amnesic to event: no      Airway interventions:  None    Breathing interventions:  None    IV access status:  None    IO access:  None    Fluids administered:  None    Cardiac interventions:  None    Medications administered:  None    Immobilization:  None    Airway condition since incident:  Stable    Breathing condition  since incident:  Stable    Circulation condition since incident:  Stable    Mental status condition since incident:  Stable    Disability condition since incident:  Stable  Associated symptoms: abdominal pain and chest pain    Associated symptoms: no back pain, no blindness, no difficulty breathing, no headaches, no hearing loss, no loss of consciousness, no nausea, no neck pain, no seizures and no vomiting    Risk factors: no AICD, no anticoagulation therapy, no asthma, no beta blocker therapy, no CABG, no CAD, no CHF, no COPD, no diabetes, no dialysis, no hemophilia, no kidney disease, no pacemaker, no past MI, not pregnant and no steroid use        Review of Systems   Constitutional: Negative.    HENT: Negative.  Negative for hearing loss.    Eyes:  Negative for blindness.   Respiratory: Negative.     Cardiovascular:  Positive for chest pain.   Gastrointestinal:  Positive for abdominal pain. Negative for nausea and vomiting.   Genitourinary: Negative.  Positive for dysuria and hematuria.   Musculoskeletal: Negative.  Negative for back pain and neck pain.   Neurological: Negative.  Negative for seizures, loss of consciousness and headaches.   Psychiatric/Behavioral: Negative.     All other systems reviewed and are negative.      Past Medical History:   Diagnosis Date    Disease of thyroid gland     Fibromyalgia     Lupus        No Known Allergies    Past Surgical History:   Procedure Laterality Date    CHOLECYSTECTOMY      SUBTOTAL HYSTERECTOMY         History reviewed. No pertinent family history.    Social History     Socioeconomic History    Marital status:    Tobacco Use    Smoking status: Never    Smokeless tobacco: Never   Substance and Sexual Activity    Alcohol use: No    Drug use: No       Prior to Admission medications    Medication Sig Start Date End Date Taking? Authorizing Provider   DULoxetine (CYMBALTA) 30 MG capsule Take 30 mg by mouth Daily.    Provider, MD Carrie   folic acid  (FOLVITE) 1 MG tablet Take 1 mg by mouth Daily.    Carrie Valadez MD   gabapentin (NEURONTIN) 300 MG capsule Take 300 mg by mouth 2 (Two) Times a Day.    Carrie Valadez MD   HYDROcodone-acetaminophen (NORCO) 7.5-325 MG per tablet Take 1 tablet by mouth 2 (Two) Times a Day As Needed for Moderate Pain .    Carrie Valadez MD   hydroxychloroquine (PLAQUENIL) 200 MG tablet Take 200 mg by mouth 2 (Two) Times a Day.    Carrie Valadez MD   levothyroxine (SYNTHROID, LEVOTHROID) 125 MCG tablet Take 125 mcg by mouth Daily.    Carrie Valadez MD   methotrexate 2.5 MG tablet Take 2.5 mg by mouth 1 (One) Time Per Week. 6 tablets once a week    Carrie Valadez MD   omeprazole (priLOSEC) 20 MG capsule Take 20 mg by mouth Daily.    Carrie Valadez MD   oxyCODONE-acetaminophen (PERCOCET) 7.5-325 MG per tablet Take 1 tablet by mouth Every 4 (Four) Hours As Needed for Moderate Pain . 12/7/17   Drea Sidhu APRN   pilocarpine (SALAGEN) 5 MG tablet Take 5 mg by mouth 3 (Three) Times a Day.    Carrie Valadez MD   PrednisoLONE 5 MG tablet Take 4 mg by mouth Daily.    Carrie Valadez MD   predniSONE (DELTASONE) 1 MG tablet Take 1 mg by mouth Daily. Take 4 tablets daily or as instructed.    Carrie Valadez MD       Medications   sodium chloride 0.9 % flush 10 mL (has no administration in time range)   cefTRIAXone (ROCEPHIN) 1,000 mg in sodium chloride 0.9 % 100 mL IVPB (1,000 mg Intravenous New Bag 11/12/23 0017)   morphine injection 4 mg (4 mg Intravenous Given 11/11/23 2241)   ondansetron (ZOFRAN) injection 4 mg (4 mg Intravenous Given 11/11/23 2241)   morphine injection 4 mg (4 mg Intravenous Given 11/12/23 0034)   phenazopyridine (PYRIDIUM) tablet 200 mg (200 mg Oral Given 11/12/23 0034)       Vitals:    11/12/23 0039   BP: 133/83   Pulse: 98   Resp: 18   Temp: 97.8 °F (36.6 °C)   SpO2: 97%         Objective   Physical Exam  Vitals and nursing note reviewed.    Constitutional:       Appearance: Normal appearance.   HENT:      Head: Normocephalic and atraumatic.      Mouth/Throat:      Mouth: Mucous membranes are moist.      Pharynx: Oropharynx is clear.   Eyes:      Extraocular Movements: Extraocular movements intact.      Pupils: Pupils are equal, round, and reactive to light.   Cardiovascular:      Rate and Rhythm: Normal rate and regular rhythm.   Pulmonary:      Effort: Pulmonary effort is normal.      Breath sounds: Normal breath sounds.      Comments: Patient is tender to palpation along the right side of her chest but there is no crepitus or deformity noted.  Chest:      Chest wall: Tenderness present.   Abdominal:      Palpations: Abdomen is soft.      Tenderness: There is abdominal tenderness.   Musculoskeletal:         General: Normal range of motion.      Cervical back: Normal range of motion and neck supple.   Skin:     General: Skin is warm and dry.   Neurological:      General: No focal deficit present.      Mental Status: She is alert and oriented to person, place, and time.   Psychiatric:         Mood and Affect: Mood normal.         Behavior: Behavior normal.         Procedures         Lab Results (last 24 hours)       Procedure Component Value Units Date/Time    CBC & Differential [[id number]]  (Abnormal) Collected: 11/11/23 2243    Specimen: Blood Updated: 11/11/23 2249    Narrative:      The following orders were created for panel order CBC & Differential.  Procedure                               Abnormality         Status                     ---------                               -----------         ------                     CBC Auto Differential[[id number]]        Abnormal            Final result                 Please view results for these tests on the individual orders.    CBC Auto Differential [[id number]]  (Abnormal) Collected: 11/11/23 2243    Specimen: Blood Updated: 11/11/23 2249     WBC 16.70 10*3/mm3      RBC 4.47 10*6/mm3      Hemoglobin  12.9 g/dL      Hematocrit 39.9 %      MCV 89.3 fL      MCH 28.9 pg      MCHC 32.3 g/dL      RDW 15.1 %      RDW-SD 49.5 fl      MPV 11.6 fL      Platelets 258 10*3/mm3      Neutrophil % 80.5 %      Lymphocyte % 10.1 %      Monocyte % 7.5 %      Eosinophil % 1.1 %      Basophil % 0.4 %      Immature Grans % 0.4 %      Neutrophils, Absolute 13.44 10*3/mm3      Lymphocytes, Absolute 1.68 10*3/mm3      Monocytes, Absolute 1.26 10*3/mm3      Eosinophils, Absolute 0.19 10*3/mm3      Basophils, Absolute 0.06 10*3/mm3      Immature Grans, Absolute 0.07 10*3/mm3      nRBC 0.0 /100 WBC     Urinalysis With Culture If Indicated - Straight Cath [[id number]]  (Abnormal) Collected: 11/11/23 2303    Specimen: Urine from Straight Cath Updated: 11/11/23 2321     Color, UA Fleming     Appearance, UA Cloudy     pH, UA 5.5     Specific Gravity, UA 1.028     Glucose, UA Negative     Ketones, UA Trace     Bilirubin, UA Negative     Blood, UA Large (3+)     Protein, UA 30 mg/dL (1+)     Leuk Esterase, UA Moderate (2+)     Nitrite, UA Negative     Urobilinogen, UA 1.0 E.U./dL    Narrative:      Dipstick results may be inaccurate due to color interference.    Urinalysis, Microscopic Only - Straight Cath [[id number]]  (Abnormal) Collected: 11/11/23 2303    Specimen: Urine from Straight Cath Updated: 11/11/23 2321     RBC, UA Too Numerous to Count /HPF      WBC, UA 11-20 /HPF      Bacteria, UA Trace /HPF      Squamous Epithelial Cells, UA None Seen /HPF      Hyaline Casts, UA None Seen /LPF      Methodology Manual Light Microscopy    Urine Culture - Urine, Straight Cath [[id number]] Collected: 11/11/23 2303    Specimen: Urine from Straight Cath Updated: 11/11/23 2321    Comprehensive Metabolic Panel [[id number]]  (Abnormal) Collected: 11/11/23 2305    Specimen: Blood Updated: 11/11/23 2346     Glucose 127 mg/dL      BUN 19 mg/dL      Creatinine 0.85 mg/dL      Sodium 140 mmol/L      Potassium 4.7 mmol/L      Comment: Specimen hemolyzed.  Result may  be falsely elevated.        Chloride 101 mmol/L      CO2 29.0 mmol/L      Calcium 9.3 mg/dL      Total Protein 6.6 g/dL      Albumin 4.0 g/dL      ALT (SGPT) 98 U/L      Comment: Specimen hemolyzed.  Result may  be falsely elevated.        AST (SGOT) 62 U/L      Comment: Specimen hemolyzed.  Result may be falsely elevated.        Alkaline Phosphatase 113 U/L      Total Bilirubin 0.3 mg/dL      Globulin 2.6 gm/dL      A/G Ratio 1.5 g/dL      BUN/Creatinine Ratio 22.4     Anion Gap 10.0 mmol/L      eGFR 81.0 mL/min/1.73     Narrative:      GFR Normal >60  Chronic Kidney Disease <60  Kidney Failure <15              CT Chest Without Contrast Diagnostic    (Results Pending)   CT Abdomen Pelvis Without Contrast    (Results Pending)       ED Course          MDM  Number of Diagnoses or Management Options  Contusion of abdominal wall, initial encounter: new and requires workup  Contusion of right chest wall, initial encounter: new and requires workup  Dysuria: new and requires workup  Fall, initial encounter: new and requires workup  Hematuria, unspecified type: new and requires workup  Diagnosis management comments: I told the patient her CT scans do not show any broken rib or any kind of intra-abdominal injury from this fall.  However the hematuria is new since the fall and she does have a little bit of ovation of her white count and some leukocytes in the urine so we will treat his infection though it may just be a traumatic hematuria.  She is pleased on all this and she is discharged in stable condition.       Amount and/or Complexity of Data Reviewed  Clinical lab tests: ordered and reviewed  Tests in the radiology section of CPT®: ordered and reviewed    Risk of Complications, Morbidity, and/or Mortality  Presenting problems: moderate  Diagnostic procedures: moderate  Management options: moderate    Patient Progress  Patient progress: stable        Final diagnoses:   Fall, initial encounter   Contusion of right chest  wall, initial encounter   Contusion of abdominal wall, initial encounter   Hematuria, unspecified type   Dysuria          Raheem Smallwood Jr., MD  11/12/23 0043

## 2023-11-14 LAB — BACTERIA SPEC AEROBE CULT: ABNORMAL

## 2024-01-02 NOTE — PROGRESS NOTES
MEDICAL HEMATOLOGY/ONCOLOGY PROGRESS NOTE       Ely Kirkpatrick   1970 6/21/2022     Chief Complaint   Patient presents with    Follow-up     Other iron deficiency anemia        INTERVAL HISTORY/HISTORY OF PRESENT ILLNESS:    Diagnosis  · Iron deficiency anemia, July 2019  · Negative upper/lower GI scope August 2019    Treatment summary  · Failed p.o. iron replacement/intolerance to p.o. iron replacement  · 2/28/2020 IV iron replacement with Injectafer 750 mg  · 5/28/21 & 6/4/21 IV iron replacement with Injectafer 750 mg x2    Hematology history  The patient has been diagnosed with iron deficiency anemia. She presents today for a routine follow-up visit. She received iron therapy in June 2021. She has felt quite good. Denies any new complaints. She also has a diagnosis of systemic lupus erythematosus. She takes Plaquenil and low-dose of prednisone. She is followed by rheumatology in [place]. She denies any overt hematuria, hematochezia. She does take PPIs for GERD symptoms. She had labs done today. No new complaints. Hematology history  Ely Kirkpatrick is a 46 y.o.  female referred by LYLE Jackson for new onset iron deficiency anemia.   · 7/18/19 CBC revealed a WBC of 13.3 with 77% PMN, 14% lymphocytes, 6% monocytes, 2% eosinophils, 1% basophils, Hgb 9.7, MCV 74, MCHC 31.8, ,000. · 7/18/19 CMP was WNL. ESR 5 (0-40)  · 7/25/19 CBC revealed a WBC of 10.6, Hgb 9.3, MCV 77.5, MCHC 30, ,000. · She was started on Niferex-150 daily and omeprazole was increased to twice daily. · 8/20/19 Follow-up CBC revealed a WBC of 9.9, Hgb 9.9, MCV 76.7, MCHC 30, ,000.   PRIOR CBCS  · 1/20/2018, WBC 11.9, Hgb 12.5, MCV 87.9, ,000  · 1/18/2017, WBC 10.4 with normal percent differential, Hgb 12.6, MCV 89.3, ,000  · 3/28/2017, WBC 10.1, Hgb 13, MCV 90.8, ,000  · 8/27/19 Endoscopy was performed by Dr. Hussain Simon was negative except for some diminutive Obtain reports from [place]   gastric polyps with small bowel biopsies negative for sprue. · 8/27/2019 upper endoscopy showed Esophagus: normal; EGJ at 40 cm and normal.There is NO hiatal hernia present. Stomach:  Normal except for a few 3-5 mm sessile hyperplastic appearing polyps in the body likely from her use of acid suppression medications. NO overt atrophic gastritis. Rugae were normal. Lumen distended well with insufflation. Duodenum: normal; random biopsies were taken to check for Celiac disease/villous atrophy  · 8/28/19 Colonoscopy performed by Dr. Keith Garcia revealed left-sided diverticulosis, grade 1 internal hemorrhoids. · Small bowel evaluation has not been performed yet. · Occult bloody stools x3 negative  · 1/3/2020 laboratory studies showed CBC showed WBC 12.4, hemoglobin 11.5/MCV 80, RDW 18, platelet counts 939,877. Iron profile showed iron 44, TIBC 419, U , iron saturation 11%, ferritin 10. CMP showed normal kidney function creatinine 0.8. LFTs normal.  Otherwise unremarkable. · 1/7/2020poor tolerance to p.o. iron with complains of abdominal pain, nausea constipation. Therefore recommend IV iron replacement with Injectafer 750mg x 2 doses. · 2/18/2020WBC 10.3, hemoglobin 13.2/MCV 86, platelets 232,104. Therefore, improvement of her anemia. · 6/4/21 Stool occult blood-positive x2,  negative x1  · 5/28/21 & 6/4/21 IV iron replacement with Injectafer 750 mg x2 doses  · 07/03/21-Iron 62, TIBC 229, %sat 27, Ferritin 284, WBC12.9, Hb 12.3/MCV82, Platelets 258T   · 9/43/1948- Endoscope Capsule- Normal Duodenal,Jejunal and Ileal mucosa. No obvious ulcers or erosion or masses or strictures or strenuous or AVMs or evidence of active or recent bleeding were detected on this study. · 10/6/2021 XR Abdomen(single) Large amount of stool in colon. No evidence of obstruction or ileus. · 11/09/21-CBC 13.1, Hb 13.1, platelets 715F.    · 05/04/22-WBC 12.4, Hb 10.9/85, Platelets 296Z  · 7/9/1801-QPAQ Studies [facility]) Iron 33, TIBC 351, Iron Sat 9, Ferritin 13. Poor oral iron tolerance. · 05/27/2022-Venofer 1000mg IV. · 06/21/2022- WBC 12.25, Hb 12.5 ( previously 10.9), Platelets 346C      PAST MEDICAL HISTORY:   Past Medical History:   Diagnosis Date    Anemia     Arthritis     RA    Fibromyalgia     GERD (gastroesophageal reflux disease)     Hypothyroidism     hypothyroidism    Iron deficiency anemia 1/6/2020    Lupus ([address]     Sjogren's disease ([address]           PAST SURGICAL HISTORY:  Past Surgical History:   Procedure Laterality Date    CHOLECYSTECTOMY      COLONOSCOPY N/A 8/27/2019    Dr Tao Rose to 8/28/19 due to prep    COLONOSCOPY N/A 8/28/2019    Dr Kennedi Chery hemorrhoids-Grade 1, suboptimal prep, diverticulosis, 5 yr recall    OVARY REMOVAL Right     UPPER GASTROINTESTINAL ENDOSCOPY N/A 8/27/2019    Dr Libia Yusuf appearing gastric polyps        SOCIAL HISTORY:  Social History     Socioeconomic History    Marital status:      Spouse name: None    Number of children: None    Years of education: None    Highest education level: None   Occupational History    None   Tobacco Use    Smoking status: Never Smoker    Smokeless tobacco: Never Used   Vaping Use    Vaping Use: Never used   Substance and Sexual Activity    Alcohol use: Never    Drug use: Never    Sexual activity: None   Other Topics Concern    None   Social History Narrative    None     Social Determinants of Health     Financial Resource Strain:     Difficulty of Paying Living Expenses: Not on file   Food Insecurity:     Worried About Running Out of Food in the Last Year: Not on file    Yennifer of Food in the Last Year: Not on file   Transportation Needs:     Lack of Transportation (Medical): Not on file    Lack of Transportation (Non-Medical):  Not on file   Physical Activity:     Days of Exercise per Week: Not on file    Minutes of Exercise per Session: Not on file   Stress:     Feeling of Stress : Not on file   Social Connections:     Frequency of Communication with Friends and Family: Not on file    Frequency of Social Gatherings with Friends and Family: Not on file    Attends Taoist Services: Not on file    Active Member of Clubs or Organizations: Not on file    Attends Club or Organization Meetings: Not on file    Marital Status: Not on file   Intimate Partner Violence:     Fear of Current or Ex-Partner: Not on file    Emotionally Abused: Not on file    Physically Abused: Not on file    Sexually Abused: Not on file   Housing Stability:     Unable to Pay for Housing in the Last Year: Not on file    Number of [place] in the Last Year: Not on file    Unstable Housing in the Last Year: Not on file       FAMILY HISTORY:  Family History   Problem Relation Age of Onset    Colon Cancer Neg Hx     Colon Polyps Neg Hx     Esophageal Cancer Neg Hx     Liver Cancer Neg Hx     Liver Disease Neg Hx     Rectal Cancer Neg Hx     Stomach Cancer Neg Hx         Current Outpatient Medications   Medication Sig Dispense Refill    hydroCHLOROthiazide (MICROZIDE) 12.5 MG capsule Take 12.5 mg by mouth every morning       DULoxetine (CYMBALTA) 60 MG extended release capsule Take 60 mg by mouth every morning      Coenzyme Q10 (CO Q10) 100 MG CAPS Take by mouth daily      ondansetron (ZOFRAN) 4 MG tablet Take 1 tablet by mouth every 8 hours as needed for Nausea or Vomiting 3 tablet 0    Ascorbic Acid (VITAMIN C) 1000 MG tablet Take 1,000 mg by mouth daily      calcium acetate-magnesium carb 450-200 MG TABS Take by mouth 2 times daily      omeprazole (PRILOSEC) 20 MG delayed release capsule Take 20 mg by mouth daily      Fe Bisgly-Succ-C-Thre-B12-FA (IRON-150 PO) Take by mouth daily       predniSONE (DELTASONE) 10 MG tablet Take 10 mg by mouth daily      hydroxychloroquine (PLAQUENIL) 200 MG tablet Take by mouth 2 times daily       gabapentin (NEURONTIN) 300 MG capsule Take 300 mg by mouth 3 extremities, no focal weakness. SKIN: warm, dry with no rashes or lesions  LYMPH: No cervical, clavicular, axillary, or inguinal lymphadenopathy  NEUROLOGIC: follows commands, non focal   PSYCH: mood and affect appropriate. Alert and oriented to time, place, person    Relevant Lab findings/reviewed by me:  6/21/2022 CBC ([address] [place] [address])  WBC 12.25  HGB 12.5    Neut 8.71    5/4/2022 Iron Studies([facility]): Iron 33, TIBC 351, Iron Sat 9%, Ferritin 13    Relevant Imaging studies/reviewed by me:  None    ASSESSMENT:    Orders Placed This Encounter   Procedures    Ferritin     Standing Status:   Future     Standing Expiration Date:   6/21/2023    Iron and TIBC     Standing Status:   Future     Standing Expiration Date:   6/21/2023     Order Specific Question:   Is Patient Fasting? Answer:   no     Order Specific Question:   No of Hours? Answer:   0    CBC with Auto Differential     Standing Status:   Future     Standing Expiration Date:   6/21/2023      Nadeem Solorzano was seen today for follow-up. Diagnoses and all orders for this visit:    Other iron deficiency anemia  -     Cancel: Iron and TIBC; Future  -     Cancel: Ferritin; Future  -     Ferritin; Future  -     Iron and TIBC; Future  -     CBC with Auto Differential; Future    Care plan discussed with patient    SLE (systemic lupus erythematosus related syndrome) (HCC)         #Iron deficiency anemia iron profile consistent with iron deficiency anemia. Upper and lower endoscopy was unrevealing. She did  have a capsule endoscopy performed that was also unremarkable. Occult blood x3 positive in 2020. Patient received IV iron therapy last year. She had very poor tolerance to PO iron replacement. She received IV iron replacement with Injectafer 750 mg x 2 dose February 2020. Iron profile now with severe iron deficiency.   · 5/28/21 & 6/4/21 IV iron replacement with Injectafer 750 mg x2 doses  · 07/03/21-Iron 62, TIBC 229, %sat 27, Ferritin 284, WBC12.9, Hb 12.3/MCV82, Platelets 642Q   Unremarkable capsule endoscopy:. NormalDuodenal,JejunalandIlealmucosa. NO obvious ulcers or erosions or masses or strictures or stenosis  or AVMs or evidence of active or recent bleeding were detected on this study. CBC today showed normal hemoglobin 10.9, Ferritin 13, iron sat 9%  -05/10/22- Recommended Venofer 500mg IV x 2 doses  -Hb 12.5, Platelets 424Y with significant improvement of Hb    Oral iron intolerance    #Hypertension/82   Ht 5' 6\" (1.676 m)   Wt 242 lb (109.8 kg)   BMI 39.06 kg/m²        COVID-19patient has not received COVID 19 vaccination yet. Plan:  · RTC with MD in 3 months  · Continue follow-up with PCP   · CBC, iron studies, ferritin in 3 months-prior to next visit      Follow Up:      Return in about 12 weeks (around 9/13/2022) for Appointment with Dr. Live Fernandez in [place]. Labs 1 week prior to next visit      I, Floresita Barker, am scribing for Mackenzie Waters MD. Electronically signed by Floresita Barker RN on 6/21/2022 at 11:47 AM CDT. I, Dr Mandie Xiong, personally performed the services described in this documentation as scribed by Floresita Barker RN in my presence and is both accurate and complete. Yvette Thurman am pre charting  as Medical Assistant for Mackenzie Waters MD. Electronically signed by Odalys Uribe MA on 6/21/2022 at 1:37 PM CDT. I have seen, examined and reviewed this patient medication list, appropriate labs and imaging studies. I reviewed relevant medical records and others physicians notes. I discussed the plans of care with the patient. I answered all the questions to the patients satisfaction. I have also reviewed the chief complaint (CC) and part of the history (History of Present Illness (HPI), Past Family Social History [facility]), or Review of Systems (ROS) and made changes when appropriated.        (Please note that portions of this note were completed with a voice recognition program. Efforts were made to edit the dictations but occasionally words are mis-transcribed.)    Electronically signed by Aleksander Brantley MD on 6/21/2022 at 11:55 AM

## 2024-01-16 DIAGNOSIS — D50.8 OTHER IRON DEFICIENCY ANEMIA: Primary | ICD-10-CM

## 2024-01-16 NOTE — PROGRESS NOTES
Lab orders entered and mailed to patient. Faxed labs to [facility] at this time as well. Electronically signed by Cecille Roa RN on 1/16/2024 at 10:28 AM

## 2024-02-07 ENCOUNTER — HOSPITAL ENCOUNTER (EMERGENCY)
Facility: HOSPITAL | Age: 54
Discharge: HOME | End: 2024-02-08
Payer: COMMERCIAL

## 2024-02-07 ENCOUNTER — APPOINTMENT (OUTPATIENT)
Dept: GENERAL RADIOLOGY | Facility: HOSPITAL | Age: 54
End: 2024-02-07
Payer: COMMERCIAL

## 2024-02-07 DIAGNOSIS — M25.552 LEFT HIP PAIN: Primary | ICD-10-CM

## 2024-02-07 PROCEDURE — 73502 X-RAY EXAM HIP UNI 2-3 VIEWS: CPT

## 2024-02-07 PROCEDURE — 99284 EMERGENCY DEPT VISIT MOD MDM: CPT

## 2024-02-07 RX ORDER — DULOXETIN HYDROCHLORIDE 60 MG/1
60 CAPSULE, DELAYED RELEASE ORAL EVERY MORNING
COMMUNITY

## 2024-02-07 RX ORDER — HYDROCHLOROTHIAZIDE 12.5 MG/1
12.5 TABLET ORAL DAILY
COMMUNITY

## 2024-02-08 ENCOUNTER — APPOINTMENT (OUTPATIENT)
Dept: CT IMAGING | Facility: HOSPITAL | Age: 54
End: 2024-02-08
Payer: COMMERCIAL

## 2024-02-08 VITALS
HEIGHT: 65 IN | TEMPERATURE: 98 F | RESPIRATION RATE: 18 BRPM | DIASTOLIC BLOOD PRESSURE: 76 MMHG | BODY MASS INDEX: 43.99 KG/M2 | SYSTOLIC BLOOD PRESSURE: 156 MMHG | WEIGHT: 264 LBS | HEART RATE: 90 BPM | OXYGEN SATURATION: 98 %

## 2024-02-08 PROCEDURE — 72192 CT PELVIS W/O DYE: CPT

## 2024-02-08 RX ORDER — ACETAMINOPHEN 500 MG
1000 TABLET ORAL ONCE
Status: COMPLETED | OUTPATIENT
Start: 2024-02-08 | End: 2024-02-08

## 2024-02-08 RX ORDER — LIDOCAINE 50 MG/G
1 PATCH TOPICAL ONCE
Status: DISCONTINUED | OUTPATIENT
Start: 2024-02-08 | End: 2024-02-08 | Stop reason: HOSPADM

## 2024-02-08 RX ADMIN — LIDOCAINE 1 PATCH: 700 PATCH TOPICAL at 00:27

## 2024-02-08 RX ADMIN — ACETAMINOPHEN 1000 MG: 500 TABLET ORAL at 00:27

## 2024-02-08 NOTE — DISCHARGE INSTRUCTIONS
Today you are seen in the ER for your symptoms.  Your CT scan was negative for fracture or dislocation.  Please follow-up with primary care provider soon as possible to reassess symptoms.  Please return the ER for any new or worsening symptoms.

## 2024-02-08 NOTE — ED PROVIDER NOTES
"Subjective   History of Present Illness  Patient is a 53-year-old female who presents emergency department with complaints of left hip pain.  Patient states that she was trying to block her son's dog with her right knee and then she turned the opposite way and felt her left hip \"roll\".  Patient states that she is unable to put any weight on her left leg.  She denies any numbness or tingling.  She is able to raise her leg to some extent, however it does hurt.  Patient denies any back pain.  She states that the pain is localized into her left hip.        Review of Systems   Musculoskeletal:  Positive for arthralgias.   All other systems reviewed and are negative.      Past Medical History:   Diagnosis Date    Disease of thyroid gland     Fibromyalgia     Lupus        No Known Allergies    Past Surgical History:   Procedure Laterality Date    CHOLECYSTECTOMY      SUBTOTAL HYSTERECTOMY         History reviewed. No pertinent family history.    Social History     Socioeconomic History    Marital status:    Tobacco Use    Smoking status: Never    Smokeless tobacco: Never   Substance and Sexual Activity    Alcohol use: No    Drug use: No           Objective   Physical Exam  Vitals and nursing note reviewed.   Constitutional:       General: She is not in acute distress.     Appearance: Normal appearance. She is normal weight. She is not ill-appearing or toxic-appearing.   HENT:      Head: Normocephalic.   Cardiovascular:      Rate and Rhythm: Normal rate and regular rhythm.      Pulses: Normal pulses.      Heart sounds: Normal heart sounds.   Pulmonary:      Effort: Pulmonary effort is normal.      Breath sounds: Normal breath sounds.   Abdominal:      General: Abdomen is flat. Bowel sounds are normal. There is no distension.      Palpations: Abdomen is soft.      Tenderness: There is no abdominal tenderness.   Musculoskeletal:         General: Tenderness present.      Cervical back: Normal range of motion and neck " supple.      Comments: Tenderness present to left hip.  Range of motion in the left hip is compromised.  Patient states that she does have pain whenever she is raising her left leg.  No leg swelling present.   Skin:     General: Skin is warm and dry.   Neurological:      General: No focal deficit present.      Mental Status: She is alert and oriented to person, place, and time. Mental status is at baseline.      Sensory: No sensory deficit.   Psychiatric:         Mood and Affect: Mood normal.         Behavior: Behavior normal.         Thought Content: Thought content normal.         Judgment: Judgment normal.         Procedures           ED Course  ED Course as of 02/08/24 0141   Thu Feb 08, 2024   0115 On reevaluation, patient was unable to ambulate without pain and discomfort.  Patient states that it did hurt to bear weight.  We will go ahead and order a CT pelvis without contrast to rule out fractures. [KR]   0135 CT pelvis reviewed by stat rad radiologist.  This revealed no acute fracture or dislocation of the pelvis or hip joints bilaterally. [KR]      ED Course User Index  [KR] Dorothy Pinedo APRN                                             Medical Decision Making  Loni Kerr is a very pleasant 53 y.o. female who presents to the emergency department for hip pain.     Patient was non-toxic and not-ill appearing on arrival. Vital signs stable.     Patient's presentation raises suspicion for differentials including, but not limited to, fracture, dislocation, muscle strain.     External (non-ED) record review: None    Given this, imaging studies were ordered including x-ray left hip and CT pelvis.    Loni was given Tylenol and Lidoderm patch for symptomatic relief.    X-ray reviewed by Dr. Baca and revealed no acute findings.  However on reevaluation, patient stated that she was still having pain and discomfort when she was trying to walk and stated that it still hurt to bear weight.  CT pelvis without  contrast was ordered to rule out acute fracture or dislocation.  This was reviewed by stat rad radiologist which was negative for acute findings.    On re-evaluation, patient remained hemodynamically stable and appeared to be comfortable and in no acute distress.  Symptoms could likely be related to muscle strain.  Patient denied falls.  States that the pain occurred after she twisted the wrong way.  Low suspicion for fracture or dislocation based on CT findings.  Patient was advised to follow-up with primary care provider as soon as possible to reassess symptoms.  If symptoms do not improve, patient may likely need to follow-up with orthopedics and physical therapy.    I discussed all of the imaging results with the patient during this visit in the emergency department. I answered all the questions regarding the emergency department evaluation, diagnosis, and treatment plan. We talked about how crucial it is for the patient to follow up by calling their primary care provider as soon as possible to schedule an appointment for within the next few days or as soon as possible so that the symptoms can be reassessed to see if they have improved or to answer any additional questions. I also provided the patient with advice on returning safely and urged the patient to visit the emergency department right away if any worsening or new symptoms appeared. The patient verbalized understanding of the discharge instructions and agreed with them. Loni was discharged in stable condition.    Signed by:   JERE Grady 2/8/2024 01:39 CST     Dragon disclaimer:  Part of this note may be an electronic transcription/translation of spoken language to printed text using the Dragon Dictation System.    Problems Addressed:  Left hip pain: complicated acute illness or injury    Amount and/or Complexity of Data Reviewed  Radiology: ordered.    Risk  OTC drugs.  Prescription drug management.        Final diagnoses:   Left hip pain        ED Disposition  ED Disposition       ED Disposition   Discharge    Condition   Stable    Comment   --               Brad Agarwal, PA  [address] [place] [id number]  [phone]    Schedule an appointment as soon as possible for a visit in 1 day      [facility] EMERGENCY DEPARTMENT  [address] [place] [id number] [phone]  Go to   If symptoms worsen         Medication List      No changes were made to your prescriptions during this visit.            Dorothy Pinedo, APRN  02/08/24 0142

## 2024-02-08 NOTE — ED NOTES
PT UNABLE TO TOLERATE AMBULATION WITHOUT DISCOMFORT IN THE LEFT PROXIMAL FEMUR/LEFT HIP.  PT ASSISTED BACK TO BED.

## 2024-02-23 ENCOUNTER — TELEPHONE (OUTPATIENT)
Dept: HEMATOLOGY | Age: 54
End: 2024-02-23

## 2024-02-23 NOTE — TELEPHONE ENCOUNTER
Called pt. to remind them of appointment on 02/27/2024 and had to leave a detailed voicemail with appointment date and time.

## 2024-02-27 ENCOUNTER — OFFICE VISIT (OUTPATIENT)
Dept: HEMATOLOGY | Age: 54
End: 2024-02-27
Payer: COMMERCIAL

## 2024-02-27 VITALS
OXYGEN SATURATION: 96 % | HEIGHT: 65 IN | BODY MASS INDEX: 42.82 KG/M2 | DIASTOLIC BLOOD PRESSURE: 82 MMHG | HEART RATE: 107 BPM | SYSTOLIC BLOOD PRESSURE: 160 MMHG | WEIGHT: 257 LBS

## 2024-02-27 DIAGNOSIS — D50.8 OTHER IRON DEFICIENCY ANEMIA: Primary | ICD-10-CM

## 2024-02-27 DIAGNOSIS — M32.9 SLE (SYSTEMIC LUPUS ERYTHEMATOSUS RELATED SYNDROME) (HCC): ICD-10-CM

## 2024-02-27 DIAGNOSIS — Z71.89 CARE PLAN DISCUSSED WITH PATIENT: ICD-10-CM

## 2024-02-27 PROCEDURE — 3077F SYST BP >= 140 MM HG: CPT | Performed by: INTERNAL MEDICINE

## 2024-02-27 PROCEDURE — 3079F DIAST BP 80-89 MM HG: CPT | Performed by: INTERNAL MEDICINE

## 2024-02-27 PROCEDURE — 99213 OFFICE O/P EST LOW 20 MIN: CPT | Performed by: INTERNAL MEDICINE

## 2024-05-05 NOTE — OP NOTE
Endoscopic Procedure Note    Patient: Micheal Blackmon : 1970  Med Rec#: [id number] Acc#: [id number]     Primary Care Provider LYLE Sánchez CNP    Endoscopist: Damaris Parr MD    Date of Procedure:  2019    Procedure:   1. EGD with cold biopsies    Indications:   1. Anemia  2. Fatigue    Anesthesia:  Sedation was administered by anesthesia who monitored the patient during the procedure. Estimated Blood Loss: minimal    Procedure:   After reviewing the patient's chart and obtaining informed consent, the patient was placed in the left lateral decubitus position. A forward-viewing Olympus endoscope was lubricated and inserted through the mouth into the oropharynx. Under direct visualization, the upper esophagus was intubated. The scope was advanced to the level of the third portion of duodenum. Scope was slowly withdrawn with careful inspection of the mucosal surfaces. The scope was retroflexed for inspection of the gastric fundus and incisura. Findings and maneuvers are listed in impression below. The patient tolerated the procedure well. The scope was removed. There were no immediate complications. Findings:   Esophagus: normal; EGJ at 40 cm and normal.  There is NO hiatal hernia present. Stomach:  Normal except for a few 3-5 mm sessile hyperplastic appearing polyps in the body likely from her use of acid suppression medications. NO overt atrophic gastritis. Rugae were normal. Lumen distended well with insufflation. Duodenum: normal; random biopsies were taken to check for Celiac disease/villous atrophy     RECOMMENDATIONS:    1. Await path results, the patient will be contacted in 7-10 days with biopsy results. 2.  Reschedule colonoscopy for tomorrow since patient reported passing thick sludge like stool this AM despite a full colon prep last PM. She will remain on clear liquids post-procedure today until MN and NPO past MN except for meds with sips of water. (M6) obeys commands

## 2024-06-13 ENCOUNTER — TELEPHONE (OUTPATIENT)
Dept: HEMATOLOGY | Age: 54
End: 2024-06-13

## 2024-06-13 NOTE — TELEPHONE ENCOUNTER
Called Patient and reminded patient of their appointment on 06/18/2024 and patient confirmed they would be here.

## 2024-06-17 DIAGNOSIS — D50.8 OTHER IRON DEFICIENCY ANEMIA: Primary | ICD-10-CM

## 2024-06-17 NOTE — PROGRESS NOTES
charlotte Fajardo pre charting  as Medical Assistant for Eleni Capps MD. Electronically signed by Airam Fajardo MA on 6/18/2024 at 10:18 AM CDT.    I have seen, examined and reviewed this patient medication list, appropriate labs and imaging studies. I reviewed relevant medical records and others physician’s notes. I discussed the plans of care with the patient. I answered all the questions to the patient’s satisfaction. I have also reviewed the chief complaint (CC) and part of the history (History of Present Illness (HPI), Past Family Social History (PFSH), or Review of Systems (ROS) and made changes when appropriated.       (Please note that portions of this note were completed with a voice recognition program. Efforts were made to edit the dictations but occasionally words are mis-transcribed.)Electronically signed by Eleni Capps MD on 6/18/2024 at 1:51 PM

## 2024-06-18 ENCOUNTER — OFFICE VISIT (OUTPATIENT)
Dept: HEMATOLOGY | Age: 54
End: 2024-06-18
Payer: COMMERCIAL

## 2024-06-18 VITALS
BODY MASS INDEX: 43.82 KG/M2 | OXYGEN SATURATION: 96 % | DIASTOLIC BLOOD PRESSURE: 82 MMHG | SYSTOLIC BLOOD PRESSURE: 136 MMHG | WEIGHT: 263 LBS | HEART RATE: 102 BPM | HEIGHT: 65 IN

## 2024-06-18 DIAGNOSIS — M35.9 AUTOIMMUNE DISEASE (HCC): ICD-10-CM

## 2024-06-18 DIAGNOSIS — M32.9 SLE (SYSTEMIC LUPUS ERYTHEMATOSUS RELATED SYNDROME) (HCC): ICD-10-CM

## 2024-06-18 DIAGNOSIS — Z71.89 CARE PLAN DISCUSSED WITH PATIENT: ICD-10-CM

## 2024-06-18 DIAGNOSIS — D50.8 OTHER IRON DEFICIENCY ANEMIA: Primary | ICD-10-CM

## 2024-06-18 PROCEDURE — G2211 COMPLEX E/M VISIT ADD ON: HCPCS | Performed by: INTERNAL MEDICINE

## 2024-06-18 PROCEDURE — 3075F SYST BP GE 130 - 139MM HG: CPT | Performed by: INTERNAL MEDICINE

## 2024-06-18 PROCEDURE — 3079F DIAST BP 80-89 MM HG: CPT | Performed by: INTERNAL MEDICINE

## 2024-06-18 PROCEDURE — 99213 OFFICE O/P EST LOW 20 MIN: CPT | Performed by: INTERNAL MEDICINE

## 2024-11-26 ENCOUNTER — TELEPHONE (OUTPATIENT)
Dept: HEMATOLOGY | Age: 54
End: 2024-11-26

## 2024-11-26 NOTE — TELEPHONE ENCOUNTER
Called pt. to remind them of appointment on 12/03/2024 and had to leave a detailed voicemail with appointment date and time. Reminded patient to just come at appointment time, and to not come at the lab appointment time

## 2024-12-02 DIAGNOSIS — D50.8 OTHER IRON DEFICIENCY ANEMIA: Primary | ICD-10-CM

## 2024-12-02 NOTE — PROGRESS NOTES
ondansetron (ZOFRAN) 4 MG tablet Take 1 tablet by mouth every 8 hours as needed for Nausea or Vomiting 3 tablet 0    Ascorbic Acid (VITAMIN C) 1000 MG tablet Take 1 tablet by mouth daily      calcium acetate-magnesium carb 450-200 MG TABS Take by mouth 2 times daily      omeprazole (PRILOSEC) 20 MG delayed release capsule Take 1 capsule by mouth daily      predniSONE (DELTASONE) 10 MG tablet Take 1 tablet by mouth daily      hydroxychloroquine (PLAQUENIL) 200 MG tablet Take by mouth 2 times daily       gabapentin (NEURONTIN) 300 MG capsule Take 1 capsule by mouth 3 times daily.      levothyroxine (SYNTHROID) 150 MCG tablet Take 1 tablet by mouth Daily      folic acid (FOLVITE) 1 MG tablet Take 1 tablet by mouth daily      DULoxetine (CYMBALTA) 30 MG extended release capsule Take 1 capsule by mouth nightly       No current facility-administered medications for this visit.     REVIEW OF SYSTEMS:   CONSTITUTIONAL: no fever, no night sweats, fatigue;  HEENT: no blurring of vision, no double vision, no hearing difficulty, no tinnitus, no ulceration, no dysplasia, no epistaxis;   LUNGS:  cough, no hemoptysis, no wheeze,  no shortness of breath;  CARDIOVASCULAR: no palpitation, no chest pain, no shortness of breath;  GI: no abdominal pain, no nausea, no vomiting, no diarrhea, no constipation;  JEREMY: no dysuria, no hematuria, no frequency or urgency, no nephrolithiasis;  MUSCULOSKELETAL: left hip pain, no joint pain, no swelling, no stiffness;  ENDOCRINE: no polyuria, no polydipsia, no cold or heat intolerance;  HEMATOLOGY: no easy bruising or bleeding, no history of clotting disorder;  DERMATOLOGY: no skin rash, no eczema, no pruritus;  NEUROLOGY: no syncope, no seizures, no numbness or tingling of hands, no numbness or tingling of feet, no paresis;      Vitals signs:  BP (!) 150/98 (Site: Right Upper Arm, Position: Sitting, Cuff Size: Medium Adult)   Pulse 97   Temp (!) 111 °F (43.9 °C)   Ht 1.651 m (5' 5\")   Wt

## 2024-12-03 ENCOUNTER — OFFICE VISIT (OUTPATIENT)
Dept: HEMATOLOGY | Age: 54
End: 2024-12-03

## 2024-12-03 VITALS
HEIGHT: 65 IN | OXYGEN SATURATION: 97 % | WEIGHT: 227 LBS | SYSTOLIC BLOOD PRESSURE: 150 MMHG | DIASTOLIC BLOOD PRESSURE: 98 MMHG | TEMPERATURE: 111 F | BODY MASS INDEX: 37.82 KG/M2 | HEART RATE: 97 BPM

## 2024-12-03 DIAGNOSIS — D50.8 OTHER IRON DEFICIENCY ANEMIA: Primary | ICD-10-CM

## 2024-12-03 DIAGNOSIS — Z71.89 CARE PLAN DISCUSSED WITH PATIENT: ICD-10-CM

## 2024-12-03 PROCEDURE — 3080F DIAST BP >= 90 MM HG: CPT | Performed by: INTERNAL MEDICINE

## 2024-12-03 PROCEDURE — 99213 OFFICE O/P EST LOW 20 MIN: CPT | Performed by: INTERNAL MEDICINE

## 2024-12-03 PROCEDURE — 3077F SYST BP >= 140 MM HG: CPT | Performed by: INTERNAL MEDICINE

## 2024-12-06 ENCOUNTER — CLINICAL DOCUMENTATION (OUTPATIENT)
Dept: HEMATOLOGY | Age: 54
End: 2024-12-06

## 2024-12-06 NOTE — PROGRESS NOTES
Dr Capps reviewed 12/3 labs. Per Dr Capps, no iron infusions needed at this time and send labs to PCP for review. Call to pt with above. Pt voiced understanding and agreed to plan of care. Lab results faxed to PCP/LYLE Coffman with [facility]

## 2025-06-13 NOTE — PROGRESS NOTES
list, appropriate labs and imaging studies. I reviewed relevant medical records and others physician’s notes. I discussed the plans of care with the patient. I answered all the questions to the patient’s satisfaction. I have also reviewed the chief complaint (CC) and part of the history (History of Present Illness (HPI), Past Family Social History (PFSH), or Review of Systems (ROS) and made changes when appropriated.       (Please note that portions of this note were completed with a voice recognition program. Efforts were made to edit the dictations but occasionally words are mis-transcribed.)Electronically signed by Eleni Capps MD on 2/27/2024 at 12:50 PM                        Not applicable

## (undated) DEVICE — FORCEPS BX L240CM JAW DIA2.4MM ORNG L CAP W/ NDL DISP RAD

## (undated) DEVICE — ENDO KIT,LOURDES HOSPITAL: Brand: MEDLINE INDUSTRIES, INC.